# Patient Record
Sex: MALE | Employment: OTHER | URBAN - METROPOLITAN AREA
[De-identification: names, ages, dates, MRNs, and addresses within clinical notes are randomized per-mention and may not be internally consistent; named-entity substitution may affect disease eponyms.]

---

## 2017-01-24 ENCOUNTER — OFFICE VISIT (OUTPATIENT)
Dept: FAMILY MEDICINE | Facility: CLINIC | Age: 42
End: 2017-01-24
Payer: COMMERCIAL

## 2017-01-24 VITALS
OXYGEN SATURATION: 95 % | TEMPERATURE: 98 F | BODY MASS INDEX: 26.9 KG/M2 | SYSTOLIC BLOOD PRESSURE: 113 MMHG | HEART RATE: 69 BPM | WEIGHT: 203 LBS | DIASTOLIC BLOOD PRESSURE: 69 MMHG | HEIGHT: 73 IN | RESPIRATION RATE: 20 BRPM

## 2017-01-24 DIAGNOSIS — B34.9 VIRAL ILLNESS: Primary | ICD-10-CM

## 2017-01-24 LAB
FLUAV+FLUBV AG SPEC QL: NORMAL
FLUAV+FLUBV AG SPEC QL: NORMAL
SPECIMEN SOURCE: NORMAL

## 2017-01-24 PROCEDURE — 87804 INFLUENZA ASSAY W/OPTIC: CPT | Performed by: NURSE PRACTITIONER

## 2017-01-24 PROCEDURE — 99213 OFFICE O/P EST LOW 20 MIN: CPT | Performed by: NURSE PRACTITIONER

## 2017-01-24 NOTE — NURSING NOTE
"Chief Complaint   Patient presents with     Sick       Initial /69 mmHg  Pulse 69  Temp(Src) 98  F (36.7  C) (Oral)  Resp 20  Ht 6' 1\" (1.854 m)  Wt 203 lb (92.08 kg)  BMI 26.79 kg/m2  SpO2 95% Estimated body mass index is 26.79 kg/(m^2) as calculated from the following:    Height as of this encounter: 6' 1\" (1.854 m).    Weight as of this encounter: 203 lb (92.08 kg).  BP completed using cuff size: ebenezer Juares MA       "

## 2017-01-24 NOTE — PROGRESS NOTES
SUBJECTIVE:                                                    Yogesh Alanis is a 41 year old male who presents to clinic today for the following health issues:      RESPIRATORY SYMPTOMS      Duration: two days. Sudden onset of symptoms.     Description  nasal congestion, cough, headache and fever    Severity: moderate    Accompanying signs and symptoms: frequently gets sinus infections    History (predisposing factors):  none    Precipitating or alleviating factors: None    Therapies tried and outcome:  Aleve, tylenol, and emergenC     -------------------------------------    Problem list and histories reviewed & adjusted, as indicated.  Additional history: as documented    Patient Active Problem List   Diagnosis     Environmental allergies     CARDIOVASCULAR SCREENING; LDL GOAL LESS THAN 160     Past Surgical History   Procedure Laterality Date     C appendectomy       Surgical history of -        bilateral knee arthroscopies     Hc repair achilles tendon,primary       left       Social History   Substance Use Topics     Smoking status: Former Smoker     Quit date: 04/28/1999     Smokeless tobacco: Never Used     Alcohol Use: Yes      Comment: social     Family History   Problem Relation Age of Onset     Asthma Sister      DIABETES No family hx of      Hypertension No family hx of      CEREBROVASCULAR DISEASE No family hx of      C.A.D. Paternal Grandmother      Breast Cancer Mother      Cancer - colorectal No family hx of      Prostate Cancer No family hx of      Alcohol/Drug No family hx of          Current Outpatient Prescriptions   Medication Sig Dispense Refill     cromolyn (OPTICROM) 4 % ophthalmic solution Place 1 drop into both eyes 4 times daily 1 Bottle 5     LANsoprazole (PREVACID) 30 MG capsule Take 1 capsule (30 mg) by mouth daily 30 capsule 11     vitamin  B complex with vitamin C (VITAMIN  B COMPLEX) TABS Take 1 tablet by mouth daily       Ascorbic Acid (VITAMIN C PO) Take 250 mg by mouth daily   "     fluticasone (FLONASE) 50 MCG/ACT nasal spray Spray 1-2 sprays into both nostrils daily 1 Package 11     UNKNOWN TO PATIENT        GARLIC PO 3 capsules a day       Multiple Vitamin (MULTIVITAMIN OR) Take  by mouth.       indomethacin (INDOCIN) 25 MG capsule Take 1 capsule (25 mg) by mouth 3 times daily (with meals) for 14 days 42 capsule 0     Allergies   Allergen Reactions     Amoxicillin      Hypertension and high blood pressure     Hydrocodone-Acetaminophen      Precipitates migraine headaches     Recent Labs   Lab Test  12/17/14   0912  11/11/13   1433  02/28/13   1610   LDL  76   --   79   HDL  52   --   44   TRIG  91   --   63   ALT  34  36   --    CR  0.99  0.93   --    GFRESTIMATED  84  >90   --    GFRESTBLACK  >90   GFR Calc    >90   --    POTASSIUM  4.3  4.2   --    TSH  2.01   --    --       BP Readings from Last 3 Encounters:   01/24/17 113/69   04/01/15 118/60   03/30/15 118/70    Wt Readings from Last 3 Encounters:   01/24/17 203 lb (92.08 kg)   04/01/15 195 lb (88.451 kg)   03/30/15 197 lb (89.359 kg)              Labs reviewed in EPIC  Problem list, Medication list, Allergies, and Medical/Social/Surgical histories reviewed in [facility] and updated as appropriate.    ROS:  Constitutional, HEENT, cardiovascular, pulmonary, gi and gu systems are negative, except as otherwise noted.    OBJECTIVE:                                                    /69 mmHg  Pulse 69  Temp(Src) 98  F (36.7  C) (Oral)  Resp 20  Ht 6' 1\" (1.854 m)  Wt 203 lb (92.08 kg)  BMI 26.79 kg/m2  SpO2 95%  Body mass index is 26.79 kg/(m^2).  EXAM:  Constitutional: healthy, alert, active and moderate distress  Neck: Neck supple. No adenopathy.  ENT: Bilateral TM's are normal.  Posterior oropharynx is clear.  Nares clear without congestion.  Cardiovascular: negative, PMI normal. No lifts, heaves, or thrills. RRR. No murmurs, clicks gallops or rub, No edema or JVD.  Respiratory: occassional hacky " coughRespirations easy and regular. No respiratory distress. Lungs sounds CTA.  Skin: warm and dry  Psychiatric: mentation appears normal. and affect normal/bright      Results for orders placed or performed in visit on 01/24/17   Influenza A/B antigen   Result Value Ref Range    Influenza A/B Agn Specimen Nasopharyngeal     Influenza A  NEG     Negative   Test results must be correlated with clinical data. If necessary, results   should be confirmed by a molecular assay or viral culture.      Influenza B  NEG     Negative   Test results must be correlated with clinical data. If necessary, results   should be confirmed by a molecular assay or viral culture.            ASSESSMENT/PLAN:                                                    (B34.9) Viral illness  (primary encounter diagnosis)  Comment: influenza negative  Plan: Influenza A/B antigen        Supportive management. Push fluids, rest. OTC cough and cold remedies, homeopathics, vitamin D okay. Anticipate that within 5-7 days sx should be improving. Call or return with new or worsening sx.      MASHA Zambrano [facility]

## 2017-09-27 ENCOUNTER — OFFICE VISIT (OUTPATIENT)
Dept: FAMILY MEDICINE | Facility: CLINIC | Age: 42
End: 2017-09-27
Payer: COMMERCIAL

## 2017-09-27 VITALS
SYSTOLIC BLOOD PRESSURE: 123 MMHG | WEIGHT: 195 LBS | OXYGEN SATURATION: 98 % | HEART RATE: 60 BPM | BODY MASS INDEX: 25.73 KG/M2 | DIASTOLIC BLOOD PRESSURE: 77 MMHG | RESPIRATION RATE: 20 BRPM

## 2017-09-27 DIAGNOSIS — K21.9 GASTROESOPHAGEAL REFLUX DISEASE WITHOUT ESOPHAGITIS: ICD-10-CM

## 2017-09-27 DIAGNOSIS — R07.81 RIB PAIN ON LEFT SIDE: Primary | ICD-10-CM

## 2017-09-27 PROCEDURE — 99213 OFFICE O/P EST LOW 20 MIN: CPT | Performed by: NURSE PRACTITIONER

## 2017-09-27 RX ORDER — LANSOPRAZOLE 30 MG/1
30 CAPSULE, DELAYED RELEASE ORAL DAILY
Qty: 30 CAPSULE | Refills: 0 | Status: SHIPPED | OUTPATIENT
Start: 2017-09-27 | End: 2018-12-03

## 2017-09-27 NOTE — PROGRESS NOTES
"  SUBJECTIVE:   Yogesh Alanis is a 42 year old male who presents to clinic today for the following health issues:      Musculoskeletal problem/pain      Duration: one month     Description  Dull, heavy pain under his left lower rib   The last day or two he has felt \"off,\" nauseated, cramping.  Appetite is good, but \"when I eat I feel extremely full.\"  No fever or weight loss.        Intensity:  mild    Accompanying signs and symptoms: dull ache - heavy feeling     History  Previous similar problem: no   Previous evaluation:  none    Precipitating or alleviating factors:  Trauma or overuse: no   Aggravating factors include: none    Therapies tried and outcome: nothing          Problem list and histories reviewed & adjusted, as indicated.  Additional history: as documented    Patient Active Problem List   Diagnosis     Environmental allergies     CARDIOVASCULAR SCREENING; LDL GOAL LESS THAN 160     Past Surgical History:   Procedure Laterality Date     C APPENDECTOMY       HC REPAIR ACHILLES TENDON,PRIMARY      left     SURGICAL HISTORY OF -       bilateral knee arthroscopies       Social History   Substance Use Topics     Smoking status: Former Smoker     Quit date: 4/28/1999     Smokeless tobacco: Never Used     Alcohol use Yes      Comment: social     Family History   Problem Relation Age of Onset     Asthma Sister      DIABETES No family hx of      Hypertension No family hx of      CEREBROVASCULAR DISEASE No family hx of      C.A.D. Paternal Grandmother      Breast Cancer Mother      Cancer - colorectal No family hx of      Prostate Cancer No family hx of      Alcohol/Drug No family hx of          Current Outpatient Prescriptions   Medication Sig Dispense Refill     cromolyn (OPTICROM) 4 % ophthalmic solution Place 1 drop into both eyes 4 times daily 1 Bottle 5     LANsoprazole (PREVACID) 30 MG capsule Take 1 capsule (30 mg) by mouth daily 30 capsule 11     vitamin  B complex with vitamin C (VITAMIN  B COMPLEX) " TABS Take 1 tablet by mouth daily       Ascorbic Acid (VITAMIN C PO) Take 250 mg by mouth daily       fluticasone (FLONASE) 50 MCG/ACT nasal spray Spray 1-2 sprays into both nostrils daily 1 Package 11     UNKNOWN TO PATIENT        GARLIC PO 3 capsules a day       Multiple Vitamin (MULTIVITAMIN OR) Take  by mouth.       indomethacin (INDOCIN) 25 MG capsule Take 1 capsule (25 mg) by mouth 3 times daily (with meals) for 14 days 42 capsule 0     Allergies   Allergen Reactions     Amoxicillin      Hypertension and high blood pressure     Hydrocodone-Acetaminophen      Precipitates migraine headaches     Recent Labs   Lab Test  12/17/14   0912  11/11/13   1433  02/28/13   1610   LDL  76   --   79   HDL  52   --   44   TRIG  91   --   63   ALT  34  36   --    CR  0.99  0.93   --    GFRESTIMATED  84  >90   --    GFRESTBLACK  >90   GFR Calc    >90   --    POTASSIUM  4.3  4.2   --    TSH  2.01   --    --       BP Readings from Last 3 Encounters:   09/27/17 123/77   01/24/17 113/69   04/01/15 118/60    Wt Readings from Last 3 Encounters:   09/27/17 195 lb (88.5 kg)   01/24/17 203 lb (92.1 kg)   04/01/15 195 lb (88.5 kg)             Labs reviewed in EPIC    Reviewed and updated as needed this visit by clinical staff     Reviewed and updated as needed this visit by Provider         ROS:  Constitutional, HEENT, cardiovascular, pulmonary, GI, , musculoskeletal, neuro, skin, endocrine and psych systems are negative, except as otherwise noted.      OBJECTIVE:   /77  Pulse 60  Resp 20  Wt 195 lb (88.5 kg)  SpO2 98%  BMI 25.73 kg/m2  Body mass index is 25.73 kg/(m^2).  Constitutional: healthy, alert and woried.  Neck: supple, no adenopathy  Cardiovascular: RRR. No murmurs, clicks gallops or rub  Respiratory: Respirations easy and regular. No respiratory distress noted. Lung sounds clear to auscultation.He complains that the site of his point tenderness is his lower ribs, not abdominal.  Gastrointestinal:  abdomen flat, soft, nontender to light or deep palpation. Bowel sounds active in 4 quadrants. No hepatosplenomegaly. No rebound or guarding.   Neurologic: Gait normal. Reflexes normal and symmetric. Sensation grossly WNL.  Psychiatric: mentation appears normal and affect normal/bright        ASSESSMENT/PLAN:     (R07.81) Rib pain on left side  (primary encounter diagnosis)  Comment: Acute  Plan: I discussed with Bharath that I feel that his pain is ribs did not abdomen.  This may be musculoskeletal, related to his aggressive exercise routines.  I did encourage him to use ibuprofen regularly to help reduce inflammation and pain.  I discussed red flag symptoms to be reevaluated such as worsening pain, difficulty breathing, or any other new symptom.    (K21.9) Gastroesophageal reflux disease without esophagitis  Comment: History of/worse  Plan: LANsoprazole (PREVACID) 30 MG CR capsule        He does have a history of gastroesophageal reflux disease. Things in his life of been much better with his indigestion but he does report intermittent indigestion. I did go ahead and give him a refill of the Prevacid today. I also discussed with him the appropriate use of intermittent Zantac for gastroesophageal reflux disease and to use the Prevacid with severe breakthrough symptoms. He agreed and understands.          MASHA Zambrano [facility]

## 2017-09-27 NOTE — MR AVS SNAPSHOT
"              After Visit Summary   2017    Yogesh Alanis    MRN: [id number]           Patient Information     Date Of Birth          1975        Visit Information        Provider Department      2017 9:20 AM Ester Payan APRN CNP [facility]        Today's Diagnoses     Rib pain on left side    -  1    Gastroesophageal reflux disease without esophagitis           Follow-ups after your visit        Who to contact     If you have questions or need follow up information about today's clinic visit or your schedule please contact [facility] directly at [phone].  Normal or non-critical lab and imaging results will be communicated to you by MyChart, letter or phone within 4 business days after the clinic has received the results. If you do not hear from us within 7 days, please contact the clinic through [employer] or phone. If you have a critical or abnormal lab result, we will notify you by phone as soon as possible.  Submit refill requests through [employer] or call your pharmacy and they will forward the refill request to us. Please allow 3 business days for your refill to be completed.          Additional Information About Your Visit        MyChart Information     [employer] lets you send messages to your doctor, view your test results, renew your prescriptions, schedule appointments and more. To sign up, go to [email or web address] . Click on \"Log in\" on the left side of the screen, which will take you to the Welcome page. Then click on \"Sign up Now\" on the right side of the page.     You will be asked to enter the access code listed below, as well as some personal information. Please follow the directions to create your username and password.     Your access code is: [email or web address]  Expires: 2017 12:43 PM     Your access code will  in 90 days. If you need help or a new code, please call your [facility] or [phone].        Care " EveryWhere ID     This is your Care EveryWhere ID. This could be used by other organizations to access your [facility] [place] [facility] medical records  [id number]        Your Vitals Were     Pulse Respirations Pulse Oximetry BMI (Body Mass Index)          60 20 98% 25.73 kg/m2         Blood Pressure from Last 3 Encounters:   09/27/17 123/77   01/24/17 113/69   04/01/15 118/60    Weight from Last 3 Encounters:   09/27/17 195 lb (88.5 kg)   01/24/17 203 lb (92.1 kg)   04/01/15 195 lb (88.5 kg)              Today, you had the following     No orders found for display         Where to get your medicines      These medications were sent to [facility] - [place], [place] - [address]  [address], [place] [address]     Phone:  [phone]     LANsoprazole 30 MG CR capsule          Primary Care Provider Office Phone # Fax #    Ester Payan, MASHA [facility] [phone]       [address] [place] [address]        Equal Access to Services     [facility] SIMMONS : Hadii aad ku hadasho Soomaali, waaxda luqadaha, qaybta kaalmada adejooyamary, tariq de leon . So [facility] [phone].    ATENCIÓN: Si habla español, tiene a finn disposición servicios gratuitos de asistencia lingüística. [facility] [phone].    We comply with applicable federal civil rights laws and [place] laws. We do not discriminate on the basis of race, color, national origin, age, disability, sex, sexual orientation, or gender identity.            Thank you!     Thank you for choosing [facility]  for your care. Our goal is always to provide you with excellent care. Hearing back from our patients is one way we can continue to improve our services. Please take a few minutes to complete the written survey that you may receive in the mail after your visit with us. Thank you!             Your Updated Medication List - Protect others around you: Learn how to safely use, store and throw  away your medicines at www.disposemymeds.org.          This list is accurate as of: 9/27/17 11:59 PM.  Always use your most recent med list.                   Brand Name Dispense Instructions for use Diagnosis    cromolyn 4 % ophthalmic solution    OPTICROM    1 Bottle    Place 1 drop into both eyes 4 times daily    Allergic conjunctivitis, bilateral       fluticasone 50 MCG/ACT spray    FLONASE    1 Package    Spray 1-2 sprays into both nostrils daily    Environmental allergies       GARLIC PO      3 capsules a day        indomethacin 25 MG capsule    INDOCIN    42 capsule    Take 1 capsule (25 mg) by mouth 3 times daily (with meals) for 14 days    Primary stabbing headache       LANsoprazole 30 MG CR capsule    PREVACID    30 capsule    Take 1 capsule (30 mg) by mouth daily    Gastroesophageal reflux disease without esophagitis       MULTIVITAMIN PO      Take  by mouth.        UNKNOWN TO PATIENT           vitamin B complex with vitamin C Tabs tablet      Take 1 tablet by mouth daily        VITAMIN C PO      Take 250 mg by mouth daily

## 2017-10-06 ENCOUNTER — TRANSFERRED RECORDS (OUTPATIENT)
Dept: HEALTH INFORMATION MANAGEMENT | Facility: CLINIC | Age: 42
End: 2017-10-06

## 2017-10-13 ENCOUNTER — TRANSFERRED RECORDS (OUTPATIENT)
Dept: HEALTH INFORMATION MANAGEMENT | Facility: CLINIC | Age: 42
End: 2017-10-13

## 2017-10-27 ENCOUNTER — TRANSFERRED RECORDS (OUTPATIENT)
Dept: HEALTH INFORMATION MANAGEMENT | Facility: CLINIC | Age: 42
End: 2017-10-27

## 2017-11-01 ENCOUNTER — OFFICE VISIT (OUTPATIENT)
Dept: FAMILY MEDICINE | Facility: CLINIC | Age: 42
End: 2017-11-01
Payer: COMMERCIAL

## 2017-11-01 VITALS
DIASTOLIC BLOOD PRESSURE: 78 MMHG | TEMPERATURE: 97.9 F | OXYGEN SATURATION: 98 % | SYSTOLIC BLOOD PRESSURE: 124 MMHG | HEART RATE: 64 BPM | WEIGHT: 205 LBS | RESPIRATION RATE: 16 BRPM | BODY MASS INDEX: 27.05 KG/M2

## 2017-11-01 DIAGNOSIS — R10.12 LUQ ABDOMINAL PAIN: Primary | ICD-10-CM

## 2017-11-01 PROCEDURE — 99213 OFFICE O/P EST LOW 20 MIN: CPT | Performed by: NURSE PRACTITIONER

## 2017-11-01 NOTE — PROGRESS NOTES
SUBJECTIVE:   Yogesh Alanis is a 42 year old male who presents to clinic today for the following health issues:  Chief Complaint   Patient presents with     Rib Pain          Follow up 09/27/2017 office visit for left lower rib pain/left upper abdominal pain.  Bharath has problems with a dull constant LUQ abdominal pain.  He was seen by GI. They did a  barium swallow and no abnormalities were noted except for reflux.  His pain has typically rated 2-3 out of 10, but sometimes is as significant as a 6 out of 10.  He has taken ibuprofen intermittently, but overall feels it has not been helpful.  He is feeling great otherwise with no fever or chills.    he denies nausea, vomiting or diarrhea.  He denies any other acute changes today.        Patient Active Problem List   Diagnosis     Environmental allergies     CARDIOVASCULAR SCREENING; LDL GOAL LESS THAN 160     Gastroesophageal reflux disease without esophagitis     Past Surgical History:   Procedure Laterality Date     C APPENDECTOMY       HC REPAIR ACHILLES TENDON,PRIMARY      left     SURGICAL HISTORY OF -       bilateral knee arthroscopies       Social History   Substance Use Topics     Smoking status: Former Smoker     Quit date: 4/28/1999     Smokeless tobacco: Never Used     Alcohol use Yes      Comment: social     Family History   Problem Relation Age of Onset     Breast Cancer Mother      C.A.D. Paternal Grandmother      Asthma Sister      DIABETES No family hx of      Hypertension No family hx of      CEREBROVASCULAR DISEASE No family hx of      Cancer - colorectal No family hx of      Prostate Cancer No family hx of      Alcohol/Drug No family hx of          Current Outpatient Prescriptions   Medication Sig Dispense Refill     LANsoprazole (PREVACID) 30 MG CR capsule Take 1 capsule (30 mg) by mouth daily 30 capsule 0     cromolyn (OPTICROM) 4 % ophthalmic solution Place 1 drop into both eyes 4 times daily 1 Bottle 5     vitamin  B complex with vitamin C  (VITAMIN  B COMPLEX) TABS Take 1 tablet by mouth daily       Ascorbic Acid (VITAMIN C PO) Take 250 mg by mouth daily       fluticasone (FLONASE) 50 MCG/ACT nasal spray Spray 1-2 sprays into both nostrils daily 1 Package 11     UNKNOWN TO PATIENT        GARLIC PO 3 capsules a day       Multiple Vitamin (MULTIVITAMIN OR) Take  by mouth.       Allergies   Allergen Reactions     Amoxicillin      Hypertension and high blood pressure     Hydrocodone-Acetaminophen      Precipitates migraine headaches     Recent Labs   Lab Test  12/17/14   0912  11/11/13   1433  02/28/13   1610   LDL  76   --   79   HDL  52   --   44   TRIG  91   --   63   ALT  34  36   --    CR  0.99  0.93   --    GFRESTIMATED  84  >90   --    GFRESTBLACK  >90   GFR Calc    >90   --    POTASSIUM  4.3  4.2   --    TSH  2.01   --    --       BP Readings from Last 3 Encounters:   11/01/17 124/78   09/27/17 123/77   01/24/17 113/69    Wt Readings from Last 3 Encounters:   11/01/17 205 lb (93 kg)   09/27/17 195 lb (88.5 kg)   01/24/17 203 lb (92.1 kg)            Labs reviewed in EPIC    Reviewed and updated as needed this visit by clinical staff       Reviewed and updated as needed this visit by Provider         ROS:  Constitutional, HEENT, cardiovascular, pulmonary, GI, , musculoskeletal, neuro, skin, endocrine and psych systems are negative, except as otherwise noted.      OBJECTIVE:   /78  Pulse 64  Temp 97.9  F (36.6  C) (Oral)  Resp 16  Wt 205 lb (93 kg)  SpO2 98%  BMI 27.05 kg/m2  Body mass index is 27.05 kg/(m^2).  GENERAL: healthy, alert and no distress  NECK: no adenopathy, no asymmetry, masses, or scars and thyroid normal to palpation  RESP: lungs clear to auscultation - no rales, rhonchi or wheezes. He complains of point tenderness to his left midline lower rib/upper abdominal area. No palpable abnormalities in this area.  CV: regular rate and rhythm, normal S1 S2, no S3 or S4, no murmur, click or rub, no peripheral  edema and peripheral pulses strong  ABDOMEN: soft, nontender, no hepatosplenomegaly, no masses and bowel sounds normal. No rebound or guarding.  No hernias appreciated.  SKIN:  warm and dry  PSYCH: mentation appears normal, affect normal/bright    Diagnostic Test Results:  Ultrasound ordered/pending    ASSESSMENT/PLAN:     (R10.12) LUQ abdominal pain  (primary encounter diagnosis)  Comment:    Uncertain  Plan: US Extremity Non Vascular Left        I discussed and reviewed which asked recurrent site for his pain.  There is no obvious hernia.  His gastroesophageal reflux disease and indigestion and is being evaluated and managed by gastrointestinal.  This is very likely musculoskeletal, but it is hard to tell.  For additional reassurance today, I did go ahead and order an ultrasound to be done of the area of pain.  He is okay to take ibuprofen or Tylenol intermittently for pain.  If the ultrasound is inconclusive, I would consider a computed tomography scan.  If his pain does not resolve, I would consider a computed tomography scan.  He agrees and understands and will let me know how his pain improves or worsens.    MASHA Zambrano [facility]

## 2017-11-01 NOTE — NURSING NOTE
"Chief Complaint   Patient presents with     Rib Pain       Initial /78  Pulse 64  Temp 97.9  F (36.6  C) (Oral)  Resp 16  Wt 205 lb (93 kg)  SpO2 98%  BMI 27.05 kg/m2 Estimated body mass index is 27.05 kg/(m^2) as calculated from the following:    Height as of 1/24/17: 6' 1\" (1.854 m).    Weight as of this encounter: 205 lb (93 kg).  Medication Reconciliation: complete       Carlos Juares MA       "

## 2017-11-01 NOTE — PATIENT INSTRUCTIONS
Please call [phone] to schedule the ultrasound.  Follow up with me with any worsening or unresolving symptoms.

## 2017-11-01 NOTE — MR AVS SNAPSHOT
"              After Visit Summary   11/1/2017    Yogesh Alanis    MRN: [id number]           Patient Information     Date Of Birth          1975        Visit Information        Provider Department      11/1/2017 6:20 PM Ester Payan APRN CNP [facility]        Today's Diagnoses     LUQ abdominal pain    -  1      Care Instructions    Please call [phone] to schedule the ultrasound.            Follow-ups after your visit        Future tests that were ordered for you today     Open Future Orders        Priority Expected Expires Ordered    US Extremity Non Vascular Left Routine  11/1/2018 11/1/2017            Who to contact     If you have questions or need follow up information about today's clinic visit or your schedule please contact [facility] directly at [phone].  Normal or non-critical lab and imaging results will be communicated to you by MyChart, letter or phone within 4 business days after the clinic has received the results. If you do not hear from us within 7 days, please contact the clinic through MyChart or phone. If you have a critical or abnormal lab result, we will notify you by phone as soon as possible.  Submit refill requests through [employer] or call your pharmacy and they will forward the refill request to us. Please allow 3 business days for your refill to be completed.          Additional Information About Your Visit        MyChart Information     Solaborate lets you send messages to your doctor, view your test results, renew your prescriptions, schedule appointments and more. To sign up, go to [email or web address] . Click on \"Log in\" on the left side of the screen, which will take you to the Welcome page. Then click on \"Sign up Now\" on the right side of the page.     You will be asked to enter the access code listed below, as well as some personal information. Please follow the directions to create your username and password.   "   Your access code is: [email or web address]  Expires: 2017 12:43 PM     Your access code will  in 90 days. If you need help or a new code, please call your [place] clinic or [phone].        Care EveryWhere ID     This is your Care EveryWhere ID. This could be used by other organizations to access your [place] medical records  [id number]        Your Vitals Were     Pulse Temperature Respirations Pulse Oximetry BMI (Body Mass Index)       64 97.9  F (36.6  C) (Oral) 16 98% 27.05 kg/m2        Blood Pressure from Last 3 Encounters:   17 124/78   17 123/77   17 113/69    Weight from Last 3 Encounters:   17 205 lb (93 kg)   17 195 lb (88.5 kg)   17 203 lb (92.1 kg)                 Today's Medication Changes          These changes are accurate as of: 17  6:25 PM.  If you have any questions, ask your nurse or doctor.               Stop taking these medicines if you haven't already. Please contact your care team if you have questions.     indomethacin 25 MG capsule   Commonly known as:  INDOCIN   Stopped by:  Ester Payan APRN CNP                    Primary Care Provider Office Phone # Fax #    MASHA Hobbs [phone] [address] [place] [address]        Equal Access to Services     KALI SIMMONS AH: Hadcindy Lopez, waaxda luqadaha, qaybta kaalmada tariq roozco adejoo montes. So [facility] [phone].    ATENCIÓN: Si habla español, tiene a finn disposición servicios gratuitos de asistencia lingüística. Llame al [phone].    We comply with applicable federal civil rights laws and [place] laws. We do not discriminate on the basis of race, color, national origin, age, disability, sex, sexual orientation, or gender identity.            Thank you!     Thank you for choosing [facility]  for your care. Our goal is always to provide you with excellent care.  Hearing back from our patients is one way we can continue to improve our services. Please take a few minutes to complete the written survey that you may receive in the mail after your visit with us. Thank you!             Your Updated Medication List - Protect others around you: Learn how to safely use, store and throw away your medicines at www.disposemymeds.org.          This list is accurate as of: 11/1/17  6:25 PM.  Always use your most recent med list.                   Brand Name Dispense Instructions for use Diagnosis    cromolyn 4 % ophthalmic solution    OPTICROM    1 Bottle    Place 1 drop into both eyes 4 times daily    Allergic conjunctivitis, bilateral       fluticasone 50 MCG/ACT spray    FLONASE    1 Package    Spray 1-2 sprays into both nostrils daily    Environmental allergies       GARLIC PO      3 capsules a day        LANsoprazole 30 MG CR capsule    PREVACID    30 capsule    Take 1 capsule (30 mg) by mouth daily    Gastroesophageal reflux disease without esophagitis       MULTIVITAMIN PO      Take  by mouth.        UNKNOWN TO PATIENT           vitamin B complex with vitamin C Tabs tablet      Take 1 tablet by mouth daily        VITAMIN C PO      Take 250 mg by mouth daily

## 2017-11-06 DIAGNOSIS — R10.12 LUQ ABDOMINAL PAIN: ICD-10-CM

## 2017-11-06 LAB
BASOPHILS # BLD AUTO: 0 10E9/L (ref 0–0.2)
BASOPHILS NFR BLD AUTO: 0.1 %
DIFFERENTIAL METHOD BLD: NORMAL
EOSINOPHIL # BLD AUTO: 0.3 10E9/L (ref 0–0.7)
EOSINOPHIL NFR BLD AUTO: 3.9 %
ERYTHROCYTE [DISTWIDTH] IN BLOOD BY AUTOMATED COUNT: 12.6 % (ref 10–15)
HCT VFR BLD AUTO: 45.3 % (ref 40–53)
HGB BLD-MCNC: 15.2 G/DL (ref 13.3–17.7)
LYMPHOCYTES # BLD AUTO: 2 10E9/L (ref 0.8–5.3)
LYMPHOCYTES NFR BLD AUTO: 28 %
MCH RBC QN AUTO: 30 PG (ref 26.5–33)
MCHC RBC AUTO-ENTMCNC: 33.6 G/DL (ref 31.5–36.5)
MCV RBC AUTO: 89 FL (ref 78–100)
MONOCYTES # BLD AUTO: 0.7 10E9/L (ref 0–1.3)
MONOCYTES NFR BLD AUTO: 9.9 %
NEUTROPHILS # BLD AUTO: 4.2 10E9/L (ref 1.6–8.3)
NEUTROPHILS NFR BLD AUTO: 58.1 %
PLATELET # BLD AUTO: 276 10E9/L (ref 150–450)
RBC # BLD AUTO: 5.07 10E12/L (ref 4.4–5.9)
WBC # BLD AUTO: 7.2 10E9/L (ref 4–11)

## 2017-11-06 PROCEDURE — 36415 COLL VENOUS BLD VENIPUNCTURE: CPT | Performed by: NURSE PRACTITIONER

## 2017-11-06 PROCEDURE — 80053 COMPREHEN METABOLIC PANEL: CPT | Performed by: NURSE PRACTITIONER

## 2017-11-06 PROCEDURE — 85025 COMPLETE CBC W/AUTO DIFF WBC: CPT | Performed by: NURSE PRACTITIONER

## 2017-11-07 LAB
ALBUMIN SERPL-MCNC: 4.5 G/DL (ref 3.4–5)
ALP SERPL-CCNC: 53 U/L (ref 40–150)
ALT SERPL W P-5'-P-CCNC: 41 U/L (ref 0–70)
ANION GAP SERPL CALCULATED.3IONS-SCNC: 5 MMOL/L (ref 3–14)
AST SERPL W P-5'-P-CCNC: 19 U/L (ref 0–45)
BILIRUB SERPL-MCNC: 1.7 MG/DL (ref 0.2–1.3)
BUN SERPL-MCNC: 18 MG/DL (ref 7–30)
CALCIUM SERPL-MCNC: 9.1 MG/DL (ref 8.5–10.1)
CHLORIDE SERPL-SCNC: 104 MMOL/L (ref 94–109)
CO2 SERPL-SCNC: 27 MMOL/L (ref 20–32)
CREAT SERPL-MCNC: 1.01 MG/DL (ref 0.66–1.25)
GFR SERPL CREATININE-BSD FRML MDRD: 81 ML/MIN/1.7M2
GLUCOSE SERPL-MCNC: 99 MG/DL (ref 70–99)
POTASSIUM SERPL-SCNC: 4 MMOL/L (ref 3.4–5.3)
PROT SERPL-MCNC: 7.9 G/DL (ref 6.8–8.8)
SODIUM SERPL-SCNC: 136 MMOL/L (ref 133–144)

## 2017-11-08 NOTE — PROGRESS NOTES
Marlo Sinha,    This note is to let you know that your complete blood count and metabolic panel looked good.    One of your liver function studies, the bilirubin, came back slightly high. The rest of your liver panel is normal so this is not overly concerning at this time.    Let's see how you feel. For follow-up, I would recommend that you recheck your bilirubin level in 2-3 months.    Ester

## 2018-11-12 ENCOUNTER — OFFICE VISIT (OUTPATIENT)
Dept: FAMILY MEDICINE | Facility: CLINIC | Age: 43
End: 2018-11-12
Payer: COMMERCIAL

## 2018-11-12 VITALS
WEIGHT: 186 LBS | RESPIRATION RATE: 18 BRPM | SYSTOLIC BLOOD PRESSURE: 124 MMHG | BODY MASS INDEX: 24.54 KG/M2 | DIASTOLIC BLOOD PRESSURE: 78 MMHG | TEMPERATURE: 98.6 F | HEART RATE: 76 BPM | OXYGEN SATURATION: 98 %

## 2018-11-12 DIAGNOSIS — F11.93 OPIOID WITHDRAWAL (H): Primary | ICD-10-CM

## 2018-11-12 PROCEDURE — 99213 OFFICE O/P EST LOW 20 MIN: CPT | Performed by: FAMILY MEDICINE

## 2018-11-12 RX ORDER — CLONIDINE HYDROCHLORIDE 0.1 MG/1
0.1 TABLET ORAL 3 TIMES DAILY
Qty: 15 TABLET | Refills: 0 | Status: SHIPPED | OUTPATIENT
Start: 2018-11-12 | End: 2018-11-15

## 2018-11-12 RX ORDER — HYDROXYZINE HYDROCHLORIDE 25 MG/1
TABLET, FILM COATED ORAL
Qty: 30 TABLET | Refills: 1 | Status: SHIPPED | OUTPATIENT
Start: 2018-11-12 | End: 2018-12-03

## 2018-11-12 NOTE — MR AVS SNAPSHOT
After Visit Summary   11/12/2018    Yogesh Alanis    MRN: [id number]           Patient Information     Date Of Birth          1975        Visit Information        Provider Department      11/12/2018 9:40 AM Tatiana Samano MD [facility]        Today's Diagnoses     Opioid withdrawal (H)    -  1       Follow-ups after your visit        Follow-up notes from your care team     Return in about 2 days (around 11/14/2018), or if symptoms worsen or fail to improve.      Who to contact     If you have questions or need follow up information about today's clinic visit or your schedule please contact [facility] directly at [phone].  Normal or non-critical lab and imaging results will be communicated to you by [email or web address], letter or phone within 4 business days after the clinic has received the results. If you do not hear from us within 7 days, please contact the clinic through [email or web address] or phone. If you have a critical or abnormal lab result, we will notify you by phone as soon as possible.  Submit refill requests through [employer] or call your pharmacy and they will forward the refill request to us. Please allow 3 business days for your refill to be completed.          Additional Information About Your Visit        MyChart Information     [employer] gives you secure access to your electronic health record. If you see a primary care provider, you can also send messages to your care team and make appointments. If you have questions, please call your primary care clinic.  If you do not have a primary care provider, please call [phone] and they will assist you.        Care EveryWhere ID     This is your Care EveryWhere ID. This could be used by other organizations to access your [place] medical records  [id number]        Your Vitals Were     Pulse Temperature Respirations Pulse Oximetry BMI (Body Mass Index)       76 98.6  F (37  C) (Oral) 18 98%  24.54 kg/m2        Blood Pressure from Last 3 Encounters:   11/12/18 124/78   11/01/17 124/78   09/27/17 123/77    Weight from Last 3 Encounters:   11/12/18 186 lb (84.4 kg)   11/01/17 205 lb (93 kg)   09/27/17 195 lb (88.5 kg)              Today, you had the following     No orders found for display         Today's Medication Changes          These changes are accurate as of 11/12/18  8:33 PM.  If you have any questions, ask your nurse or doctor.               Start taking these medicines.        Dose/Directions    cloNIDine 0.1 MG tablet   Commonly known as:  CATAPRES   Used for:  Opioid withdrawal (H)   Started by:  Tatiana Samano MD        Dose:  0.1 mg   Take 1 tablet (0.1 mg) by mouth 3 times daily X 3 days then bid x 2 days then once daily x 2 days then discontinue prn withdrawal   Quantity:  15 tablet   Refills:  0       hydrOXYzine 25 MG tablet   Commonly known as:  ATARAX   Used for:  Opioid withdrawal (H)   Started by:  Tatiana Samano MD        1-2 tabs q 6 hours anxiety   Quantity:  30 tablet   Refills:  1            Where to get your medicines      These medications were sent to [facility] - [place], [place] - [address]  [address], [place] [address]     Phone:  [phone]     cloNIDine 0.1 MG tablet    hydrOXYzine 25 MG tablet                Primary Care Provider Office Phone # Fax #    Ester Payan, APRN [facility] [phone]       [address] [place] [address]        Equal Access to Services     VA SIMMONS AH: Joo Lopez, wadwightda luqromeo, qaybta kaalmatariq do. So [facility] [phone].    ATENCIÓN: Si habla español, tiene a finn disposición servicios gratuitos de asistencia lingüística. John al [phone].    We comply with applicable federal civil rights laws and [place] laws. We do not discriminate on the basis of race, color,  national origin, age, disability, sex, sexual orientation, or gender identity.            Thank you!     Thank you for choosing [facility]  for your care. Our goal is always to provide you with excellent care. Hearing back from our patients is one way we can continue to improve our services. Please take a few minutes to complete the written survey that you may receive in the mail after your visit with us. Thank you!             Your Updated Medication List - Protect others around you: Learn how to safely use, store and throw away your medicines at www.disposemymeds.org.          This list is accurate as of 11/12/18  8:33 PM.  Always use your most recent med list.                   Brand Name Dispense Instructions for use Diagnosis    cloNIDine 0.1 MG tablet    CATAPRES    15 tablet    Take 1 tablet (0.1 mg) by mouth 3 times daily X 3 days then bid x 2 days then once daily x 2 days then discontinue prn withdrawal    Opioid withdrawal (H)       cromolyn 4 % ophthalmic solution    OPTICROM    1 Bottle    Place 1 drop into both eyes 4 times daily    Allergic conjunctivitis, bilateral       fluticasone 50 MCG/ACT spray    FLONASE    1 Package    Spray 1-2 sprays into both nostrils daily    Environmental allergies       GARLIC PO      3 capsules a day        hydrOXYzine 25 MG tablet    ATARAX    30 tablet    1-2 tabs q 6 hours anxiety    Opioid withdrawal (H)       LANsoprazole 30 MG CR capsule    PREVACID    30 capsule    Take 1 capsule (30 mg) by mouth daily    Gastroesophageal reflux disease without esophagitis       MULTIVITAMIN PO      Take  by mouth.        UNKNOWN TO PATIENT           vitamin B complex with vitamin C Tabs tablet      Take 1 tablet by mouth daily        VITAMIN C PO      Take 250 mg by mouth daily

## 2018-11-15 ENCOUNTER — TELEPHONE (OUTPATIENT)
Dept: FAMILY MEDICINE | Facility: CLINIC | Age: 43
End: 2018-11-15

## 2018-11-15 DIAGNOSIS — F11.93 OPIOID WITHDRAWAL (H): ICD-10-CM

## 2018-11-15 RX ORDER — CLONIDINE HYDROCHLORIDE 0.1 MG/1
TABLET ORAL
Qty: 2 TABLET | Refills: 0 | Status: SHIPPED | OUTPATIENT
Start: 2018-11-15 | End: 2018-12-03

## 2018-11-15 RX ORDER — CLONIDINE HYDROCHLORIDE 0.1 MG/1
0.1 TABLET ORAL 3 TIMES DAILY
Qty: 2 TABLET | Refills: 0 | Status: CANCELLED | OUTPATIENT
Start: 2018-11-15

## 2018-11-15 NOTE — TELEPHONE ENCOUNTER
Sent in 2 additional clonidine to his pharmacy to finish taper.    He could try 3 hydroxyzine before he goes to bed if desired.  Taking another at 1-2 AM is fine if he has good results.    I think once the plate is out in December, much of this will improve.  For now can use 1-4 hyroxyzine q 6 hours prn anxiety/sleep.

## 2018-11-15 NOTE — TELEPHONE ENCOUNTER
Writer called patient left non detailed message requesting return call to clinic and ask to speak with nurse    Carlita Chan RN

## 2018-11-15 NOTE — TELEPHONE ENCOUNTER
Routing to provider - [place] - please review and advise as appropriate       1. Medication question:  cloNIDine (CATAPRES) 0.1 MG tablet    Patient states he is short 2 tablets of cloNIDine (CATAPRES) 0.1 MG tablet for completion of last taper day and wants to make sure he has these - having anxiety about having withdrawal - states pharmacy didn't dispense correctly - states compliant and taking appropriately    He was also concerned about withdrawal from the clonidine      2. Medication question: hydrOXYzine (ATARAX) 25 MG tablet    Sleep problem:  Keeps waking up at 2 am last couple of nights & this is causing him anxiety     He typically takes 2 tablets hydroxyzine before bed - wakes up around 2 am - takes 1 hydroxyzine and this is effective in helping him return to sleep - patient would like to know should he continue to take hydroxyzine when he wakes up at night? Should he being doing anything or does provider have any sleep suggestions?       3. Pre-op appointment request - surgically for shoulder plate removal on 12/11/18 - writer scheduled 12/3/18 10:00 AM      Thank you,  Carlita Chan RN

## 2018-12-03 ENCOUNTER — OFFICE VISIT (OUTPATIENT)
Dept: FAMILY MEDICINE | Facility: CLINIC | Age: 43
End: 2018-12-03
Payer: COMMERCIAL

## 2018-12-03 VITALS
SYSTOLIC BLOOD PRESSURE: 128 MMHG | TEMPERATURE: 97.8 F | RESPIRATION RATE: 16 BRPM | WEIGHT: 193 LBS | HEIGHT: 73 IN | HEART RATE: 69 BPM | DIASTOLIC BLOOD PRESSURE: 80 MMHG | BODY MASS INDEX: 25.58 KG/M2 | OXYGEN SATURATION: 97 %

## 2018-12-03 DIAGNOSIS — Z84.81 FAMILY HISTORY OF BRCA GENE POSITIVE: ICD-10-CM

## 2018-12-03 DIAGNOSIS — Z01.818 PREOP GENERAL PHYSICAL EXAM: Primary | ICD-10-CM

## 2018-12-03 DIAGNOSIS — Z96.9 RETAINED ORTHOPEDIC HARDWARE: ICD-10-CM

## 2018-12-03 LAB
BASOPHILS # BLD AUTO: 0 10E9/L (ref 0–0.2)
BASOPHILS NFR BLD AUTO: 0.3 %
DIFFERENTIAL METHOD BLD: NORMAL
EOSINOPHIL # BLD AUTO: 0.1 10E9/L (ref 0–0.7)
EOSINOPHIL NFR BLD AUTO: 2.2 %
ERYTHROCYTE [DISTWIDTH] IN BLOOD BY AUTOMATED COUNT: 12.7 % (ref 10–15)
HCT VFR BLD AUTO: 41.3 % (ref 40–53)
HGB BLD-MCNC: 13.4 G/DL (ref 13.3–17.7)
LYMPHOCYTES # BLD AUTO: 2.1 10E9/L (ref 0.8–5.3)
LYMPHOCYTES NFR BLD AUTO: 35 %
MCH RBC QN AUTO: 29.1 PG (ref 26.5–33)
MCHC RBC AUTO-ENTMCNC: 32.4 G/DL (ref 31.5–36.5)
MCV RBC AUTO: 90 FL (ref 78–100)
MONOCYTES # BLD AUTO: 0.5 10E9/L (ref 0–1.3)
MONOCYTES NFR BLD AUTO: 7.6 %
NEUTROPHILS # BLD AUTO: 3.2 10E9/L (ref 1.6–8.3)
NEUTROPHILS NFR BLD AUTO: 54.9 %
PLATELET # BLD AUTO: 298 10E9/L (ref 150–450)
RBC # BLD AUTO: 4.6 10E12/L (ref 4.4–5.9)
WBC # BLD AUTO: 5.9 10E9/L (ref 4–11)

## 2018-12-03 PROCEDURE — 36415 COLL VENOUS BLD VENIPUNCTURE: CPT | Performed by: FAMILY MEDICINE

## 2018-12-03 PROCEDURE — 99214 OFFICE O/P EST MOD 30 MIN: CPT | Performed by: FAMILY MEDICINE

## 2018-12-03 PROCEDURE — 85025 COMPLETE CBC W/AUTO DIFF WBC: CPT | Performed by: FAMILY MEDICINE

## 2018-12-03 ASSESSMENT — PAIN SCALES - GENERAL: PAINLEVEL: MILD PAIN (2)

## 2018-12-03 NOTE — MR AVS SNAPSHOT
After Visit Summary   12/3/2018    Yogesh Alanis    MRN: [id number]           Patient Information     Date Of Birth          1975        Visit Information        Provider Department      12/3/2018 10:00 AM Tatiana Samano MD [facility]        Today's Diagnoses     Preop general physical exam    -  1    Family history of BRCA gene positive          Care Instructions      Before Your Surgery      Call your surgeon if there is any change in your health. This includes signs of a cold or flu (such as a sore throat, runny nose, cough, rash or fever).    Do not smoke, drink alcohol or take over the counter medicine (unless your surgeon or primary care doctor tells you to) for the 24 hours before and after surgery.    If you take prescribed drugs: Follow your doctor s orders about which medicines to take and which to stop until after surgery.    Eating and drinking prior to surgery: follow the instructions from your surgeon    Take a shower or bath the night before surgery. Use the soap your surgeon gave you to gently clean your skin. If you do not have soap from your surgeon, use your regular soap. Do not shave or scrub the surgery site.  Wear clean pajamas and have clean sheets on your bed.           Follow-ups after your visit        Additional Services     CANCER RISK MGMT/CANCER GENETIC COUNSELING REFERRAL       Your provider has referred you to the Cancer Risk Management Program - Cancer Genetic Counseling.    Reason for Referral: BRCA positive mom and sister    We have a sent a notice to a staff member of the Cancer Risk Management Program to give you a call to assist with scheduling your appointment.  You may also call  [phone] to initiate scheduling.    Please be aware that coverage of these services is subject to the terms and limitations of your health insurance plan.  Call member services at your health plan with any  "benefit or coverage questions.      Please bring the completed family history sheet to your appointment in addition to any available outside medical records documenting your cancer diagnosis.                  Who to contact     If you have questions or need follow up information about today's clinic visit or your schedule please contact [facility] directly at [phone].  Normal or non-critical lab and imaging results will be communicated to you by MyChart, letter or phone within 4 business days after the clinic has received the results. If you do not hear from us within 7 days, please contact the clinic through SeekSherpahart or phone. If you have a critical or abnormal lab result, we will notify you by phone as soon as possible.  Submit refill requests through [employer] or call your pharmacy and they will forward the refill request to us. Please allow 3 business days for your refill to be completed.          Additional Information About Your Visit        MyChart Information     [employer] gives you secure access to your electronic health record. If you see a primary care provider, you can also send messages to your care team and make appointments. If you have questions, please call your primary care clinic.  If you do not have a primary care provider, please call [phone] and they will assist you.        Care EveryWhere ID     This is your Care EveryWhere ID. This could be used by other organizations to access your [place] medical records  [id number]        Your Vitals Were     Pulse Temperature Respirations Height Pulse Oximetry BMI (Body Mass Index)    69 97.8  F (36.6  C) (Oral) 16 6' 1\" (1.854 m) 97% 25.46 kg/m2       Blood Pressure from Last 3 Encounters:   12/03/18 128/80   11/12/18 124/78   11/01/17 124/78    Weight from Last 3 Encounters:   12/03/18 193 lb (87.5 kg)   11/12/18 186 lb (84.4 kg)   11/01/17 205 lb (93 kg)              We Performed the Following     CANCER RISK MGMT/CANCER " GENETIC COUNSELING REFERRAL     CBC with platelets and differential          Today's Medication Changes          These changes are accurate as of 12/3/18 10:46 AM.  If you have any questions, ask your nurse or doctor.               Stop taking these medicines if you haven't already. Please contact your care team if you have questions.     UNKNOWN TO PATIENT   Stopped by:  Tatiana Samano MD           vitamin B complex with vitamin C tablet   Stopped by:  Tatiana Samano MD                    Primary Care Provider Office Phone # Fax #    Ester Watsongaylanbalexandro, MASHA [facility] [phone] [address] [place] [address]        Equal Access to Services     KALI SIMMONS : Joo kento Sohaim, waaxda luqadaha, qaybta kaalmada adejooyada, tariq de leon . So [facility] [phone].    ATENCIÓN: Si habla español, tiene a finn disposición servicios gratuitos de asistencia lingüística. Llame al [phone].    We comply with applicable federal civil rights laws and [place] laws. We do not discriminate on the basis of race, color, national origin, age, disability, sex, sexual orientation, or gender identity.            Thank you!     Thank you for choosing [facility]  for your care. Our goal is always to provide you with excellent care. Hearing back from our patients is one way we can continue to improve our services. Please take a few minutes to complete the written survey that you may receive in the mail after your visit with us. Thank you!             Your Updated Medication List - Protect others around you: Learn how to safely use, store and throw away your medicines at www.disposemymeds.org.          This list is accurate as of 12/3/18 10:46 AM.  Always use your most recent med list.                   Brand Name Dispense Instructions for use Diagnosis    cloNIDine 0.1 MG tablet    CATAPRES    2 tablet     once daily x 2 days then discontinue prn withdrawal    Opioid withdrawal (H)       cromolyn 4 % ophthalmic solution    OPTICROM    1 Bottle    Place 1 drop into both eyes 4 times daily    Allergic conjunctivitis, bilateral       fluticasone 50 MCG/ACT nasal spray    FLONASE    1 Package    Spray 1-2 sprays into both nostrils daily    Environmental allergies       GARLIC PO      3 capsules a day        hydrOXYzine 25 MG tablet    ATARAX    30 tablet    1-2 tabs q 6 hours anxiety    Opioid withdrawal (H)       LANsoprazole 30 MG DR capsule    PREVACID    30 capsule    Take 1 capsule (30 mg) by mouth daily    Gastroesophageal reflux disease without esophagitis       MULTIVITAMIN PO      Take  by mouth.        VITAMIN C PO      Take 250 mg by mouth daily
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
## 2019-01-16 NOTE — TELEPHONE ENCOUNTER
RECORDS STATUS - BREAST    RECORDS RECEIVED FROM: [facility]   DATE RECEIVED: 1/16/2019   NOTES STATUS DETAILS   OFFICE NOTE from referring provider  Epic   OFFICE NOTE from medical oncologist     OFFICE NOTE from surgeon     OFFICE NOTE from radiation oncologist     DISCHARGE SUMMARY from hospital     DISCHARGE REPORT from the      OPERATIVE REPORT     MEDICATION LIST     CLINICAL TRIAL TREATMENTS TO DATE     LABS     PATHOLOGY REPORTS  (Tissue diagnosis, Stage, ER/NE percentage positive and intensity of staining, HER2 IHC, FISH, and all biopsies from breast and any distant metastasis)                  Epic   GENONOMIC TESTING     TYPE:   (Next Generation Sequencing, including Foundation One testing, and Oncotype score)     IMAGING (NEED IMAGES & REPORT)     CT SCANS     MRI     MAMMO     ULTRASOUND     PET     BONE SCAN     BRAIN MRI

## 2019-01-17 ENCOUNTER — PRE VISIT (OUTPATIENT)
Dept: ONCOLOGY | Facility: CLINIC | Age: 44
End: 2019-01-17

## 2019-01-17 ENCOUNTER — ONCOLOGY VISIT (OUTPATIENT)
Dept: ONCOLOGY | Facility: CLINIC | Age: 44
End: 2019-01-17
Attending: GENETIC COUNSELOR, MS
Payer: COMMERCIAL

## 2019-01-17 DIAGNOSIS — Z84.81 FAMILY HISTORY OF BRCA1 GENE POSITIVE: Primary | ICD-10-CM

## 2019-01-17 DIAGNOSIS — Z80.3 FAMILY HISTORY OF MALIGNANT NEOPLASM OF BREAST: ICD-10-CM

## 2019-01-17 LAB — MISCELLANEOUS TEST: NORMAL

## 2019-01-17 PROCEDURE — 96040 ZZH GENETIC COUNSELING, EACH 30 MINUTES: CPT | Performed by: GENETIC COUNSELOR, MS

## 2019-01-17 NOTE — PROGRESS NOTES
"1/17/2019    Referring Provider: Tatiana Samano MD    Presenting Information:   I met with Yogesh Alanis today for genetic counseling at the Cancer Risk Management Program at the [facility] to discuss the known BRCA1 mutation in his family. Specifically, the familial BRCA1 mutation identified in his sister is a pathogenic deletion of exons 8-11. He is here today to review this history, cancer screening recommendations, and available genetic testing options.    Personal History:  Yogesh is a 43 year old male.  He does not have any personal history of cancer.  He is followed by his primary care provider regularly, and reports no other cancer screening at this time.  Yogesh reported no current tobacco use, and no concerns regarding alcohol use or environmental exposures.  He has three children, ages 11, 14 and 21.    Family History: (Please see scanned pedigree for detailed family history information)    One sister is 40 and recently completed genetic testing due to the family history of breast cancer which identified a pathogenic deletion of exons 8-11 in the BRCA1 gene.  Per her test report, this specific mutation is called \"c.(?_548)_(4185+1_?)del and is a common large rearrangement Mexican  mutation. This testing, completed through the [facility] at [facility] included the \"Breast Cancer Expanded Panel\" (HEENA, BARD1, BRCA1, BRCA2, BRIP1, CDH1, MRE11A, MUTYH, NBN, PALB2, PTEN, RAD50, RAD51C, RAD51D, STK11, TP53).  A copy of her positive test report was available today for review, and Andrey's sister gave verbal consent to access this test result to facilitate testing today.        His mother was diagnosed with breast cancer in her early 50's and is now 61.  She has not completed any genetic testing.  She reportedly has two maternal half sisters who were diagnosed with early onset breast cancer (one in her 40's, and one in her 30's-40's).  No " "known genetic testing has been completed    His maternal grandmother passed away in her 30's-40's due to breast cancer.      Yogesh's paternal family history is not significant for any reported cancers.     His maternal ethnicity is Slovenian/Marshallese/Kazakh/. His paternal ethnicity is Welsh/Armenian [place].  There is no known Ashkenazi Roman Catholic ancestry on either side of his family.     Discussion:    Yogesh has a family history of breast cancer and an identified BRCA1 gene mutation.  Specifically, the mutation identified in his sister is called \"deletion of exons 8-11\".  We discussed that this gene mutation is consistent with a diagnosis of hereditary breast and ovarian cancer syndrome.  We discussed the impact of this testing on Yogesh and his family in detail.      We discussed the natural history and genetics of Hereditary Breast and Ovarian Cancer (HBOC) syndrome caused by mutations in the BRCA1 gene. A detailed handout regarding this cancer syndrome and the information we discussed was provided to Yogesh at the end of our appointment today and can be found in the after visit summary.  Topics included: inheritance pattern, cancer risks, cancer screening recommendations, and also risks, benefits and limitations of testing.    We reviewed that mutations in the BRCA1 gene are inherited in an autosomal dominant pattern.  This means that Yogesh has a 50% chance of inheriting the same mutation which was identified in his sister. Yogesh's maternal family of several relatives diagnosed with early onset breast cancer is suggestive of this BRCA1 gene mutation, however testing has not yet been completed in his family to confirm this inheritance.         Based on his family history of breast cancer, Yogesh meets the National Comprehensive Cancer Network (NCCN) criteria for genetic testing of BRCA1 and BRCA2.    We discussed that there are additional genes that could cause increased risk for breast cancer.  As " many of these genes present with overlapping features in a family and accurate cancer risk cannot always be established based upon the pedigree analysis alone, it would be reasonable for Yogesh to consider panel genetic testing to analyze multiple genes at once.  However, genetic testing for these additional breast cancer susceptibility genes is typically most informative when it is first performed on a family member with a personal history of breast cancer. In this situation, we discussed that Yogesh's mother would be the best person to pursue this testing.  Genetic counseling is recommended for his mother to discuss available testing options due to her significant personal and family history of breast cancer, including the BRCA1 gene mutation in their family.      We reviewed available genetic testing options for Yogesh, including single gene BRCA1 analysis and panel testing to include additional genes associated with breast cancer.  Yogesh expressed interest in only proceeding with testing for the BRCA1 gene at this time due to his sister's positive test result, and plans to follow up with his mother regarding the possibility of additional testing.      Consent was obtained and BRCA1 gene analysis was sent to [facility].  A copy of his sister's positive test report was also sent to the testing laboratory.  Results should be available in approximately 3-4 weeks.      We reviewed the implications of both positive and negative test result for Yogesh.  If testing comes back positive, Yogesh will be recommended to follow the screening recommendations for men with BRCA1 mutations as published by the National Comprehensive Cancer Network (NCCN). If testing comes back negative, Yogesh would not be at an increased risk for BRCA1-associated cancers.  We also discussed that Yogesh's results would also help determine the risk to his children carrying this mutation.  These recommendations will be  discussed in detail once genetic testing is completed.      Plan:  1) Today Yogesh elected to proceed with BRCA1 genetic testing through [facility].  2) This information should be available in 3-4 weeks.  3) Yogesh will be contacted to schedule a return genetic counseling appointment to discuss his results.      Face to face time: 40 minutes    Faye Mccarthy MS, [facility]  Licensed Genetic Counselor  [phone]

## 2019-01-17 NOTE — LETTER
"    1/17/2019         RE: Yogesh Alanis  [address] [place] [address]        Dear Colleague,    Thank you for referring your patient, Yogesh Alanis, to the CANCER RISK MANAGEMENT PROGRAM. Please see a copy of my visit note below.    1/17/2019    Referring Provider: Tatiana Samano MD    Presenting Information:   I met with Yogesh Alanis today for genetic counseling at the Cancer Risk Management Program at the [facility] to discuss the known BRCA1 mutation in his family. Specifically, the familial BRCA1 mutation identified in his sister is a pathogenic deletion of exons 8-11. He is here today to review this history, cancer screening recommendations, and available genetic testing options.    Personal History:  Yogesh is a 43 year old male.  He does not have any personal history of cancer.  He is followed by his primary care provider regularly, and reports no other cancer screening at this time.  Yogesh reported no current tobacco use, and no concerns regarding alcohol use or environmental exposures.  He has three children, ages 11, 14 and 21.    Family History: (Please see scanned pedigree for detailed family history information)    One sister is 40 and recently completed genetic testing due to the family history of breast cancer which identified a pathogenic deletion of exons 8-11 in the BRCA1 gene.  Per her test report, this specific mutation is called \"c.(?_548)_(0295+1_?)del and is a common large rearrangement Mexican  mutation. This testing, completed through the [facility] at [facility] included the \"Breast Cancer Expanded Panel\" (HEENA, BARD1, BRCA1, BRCA2, BRIP1, CDH1, MRE11A, MUTYH, NBN, PALB2, PTEN, RAD50, RAD51C, RAD51D, STK11, TP53).  A copy of her positive test report was available today for review, and Andrey's sister gave verbal consent to access this test result to facilitate testing today.        His mother was " "diagnosed with breast cancer in her early 50's and is now 61.  She has not completed any genetic testing.  She reportedly has two maternal half sisters who were diagnosed with early onset breast cancer (one in her 40's, and one in her 30's-40's).  No known genetic testing has been completed    His maternal grandmother passed away in her 30's-40's due to breast cancer.      Yogesh's paternal family history is not significant for any reported cancers.     His maternal ethnicity is Yoruba/Tanzanian/Arabic/. His paternal ethnicity is Dutch/Pakistani Latvian.  There is no known Ashkenazi Episcopalian ancestry on either side of his family.     Discussion:    Yogesh has a family history of breast cancer and an identified BRCA1 gene mutation.  Specifically, the mutation identified in his sister is called \"deletion of exons 8-11\".  We discussed that this gene mutation is consistent with a diagnosis of hereditary breast and ovarian cancer syndrome.  We discussed the impact of this testing on Yogesh and his family in detail.      We discussed the natural history and genetics of Hereditary Breast and Ovarian Cancer (HBOC) syndrome caused by mutations in the BRCA1 gene. A detailed handout regarding this cancer syndrome and the information we discussed was provided to Yogesh at the end of our appointment today and can be found in the after visit summary.  Topics included: inheritance pattern, cancer risks, cancer screening recommendations, and also risks, benefits and limitations of testing.    We reviewed that mutations in the BRCA1 gene are inherited in an autosomal dominant pattern.  This means that Yogesh has a 50% chance of inheriting the same mutation which was identified in his sister. Yogesh's maternal family of several relatives diagnosed with early onset breast cancer is suggestive of this BRCA1 gene mutation, however testing has not yet been completed in his family to confirm this inheritance.         Based on " his family history of breast cancer, Yogesh meets the National Comprehensive Cancer Network (NCCN) criteria for genetic testing of BRCA1 and BRCA2.    We discussed that there are additional genes that could cause increased risk for breast cancer.  As many of these genes present with overlapping features in a family and accurate cancer risk cannot always be established based upon the pedigree analysis alone, it would be reasonable for Yogesh to consider panel genetic testing to analyze multiple genes at once.  However, genetic testing for these additional breast cancer susceptibility genes is typically most informative when it is first performed on a family member with a personal history of breast cancer. In this situation, we discussed that Yogesh's mother would be the best person to pursue this testing.  Genetic counseling is recommended for his mother to discuss available testing options due to her significant personal and family history of breast cancer, including the BRCA1 gene mutation in their family.      We reviewed available genetic testing options for Yogesh, including single gene BRCA1 analysis and panel testing to include additional genes associated with breast cancer.  Yogesh expressed interest in only proceeding with testing for the BRCA1 gene at this time due to his sister's positive test result, and plans to follow up with his mother regarding the possibility of additional testing.      Consent was obtained and BRCA1 gene analysis was sent to [facility].  A copy of his sister's positive test report was also sent to the testing laboratory.  Results should be available in approximately 3-4 weeks.      We reviewed the implications of both positive and negative test result for Yogesh.  If testing comes back positive, Yogesh will be recommended to follow the screening recommendations for men with BRCA1 mutations as published by the National Comprehensive Cancer Network (NCCN). If  testing comes back negative, Yogesh would not be at an increased risk for BRCA1-associated cancers.  We also discussed that Yogesh's results would also help determine the risk to his children carrying this mutation.  These recommendations will be discussed in detail once genetic testing is completed.      Plan:  1) Today Yogesh elected to proceed with BRCA1 genetic testing through [facility].  2) This information should be available in 3-4 weeks.  3) Yogesh will be contacted to schedule a return genetic counseling appointment to discuss his results.      Face to face time: 40 minutes    Faye Mccarthy MS, [facility]  Licensed Genetic Counselor  [phone]                Again, thank you for allowing me to participate in the care of your patient.        Sincerely,        Faye Mccarthy, GC

## 2019-01-17 NOTE — LETTER
"    Cancer Risk Management  Program [facility]  Mailing Address  Cancer Risk Management Program  [facility]  [address]  [address]  [place] [address]    New patient appointments  [phone]  January 18, 2019    Yogesh BAR Annabelle  [address] [place] [address]      Dear Yogesh,    It was a pleasure meeting with you at the [facility] on 1/17/2019.  Here is a copy of the progress note from your recent genetic counseling visit to the Cancer Risk Management Program.  If you have any additional questions, please feel free to call.    Referring Provider: Tatiana Samano MD    Presenting Information:   I met with Yogesh Alanis today for genetic counseling at the Cancer Risk Management Program at the [facility] to discuss the known BRCA1 mutation in his family. Specifically, the familial BRCA1 mutation identified in his sister is a pathogenic deletion of exons 8-11. He is here today to review this history, cancer screening recommendations, and available genetic testing options.    Personal History:  Yogesh is a 43 year old male.  He does not have any personal history of cancer.  He is followed by his primary care provider regularly, and reports no other cancer screening at this time.  Yogesh reported no current tobacco use, and no concerns regarding alcohol use or environmental exposures.  He has three children, ages 11, 14 and 21.    Family History: (Please see scanned pedigree for detailed family history information)    One sister is 40 and recently completed genetic testing due to the family history of breast cancer which identified a pathogenic deletion of exons 8-11 in the BRCA1 gene.  Per her test report, this specific mutation is called \"c.(?_348)_(4185+1_?)del and is a " "common large rearrangement [place]  mutation. This testing, completed through the [facility] included the \"Breast Cancer Expanded Panel\" (HEENA, BARD1, BRCA1, BRCA2, BRIP1, CDH1, MRE11A, MUTYH, NBN, PALB2, PTEN, RAD50, RAD51C, RAD51D, STK11, TP53).  A copy of her positive test report was available today for review, and Andrey's sister gave verbal consent to access this test result to facilitate testing today.        His mother was diagnosed with breast cancer in her early 50's and is now 61.  She has not completed any genetic testing.  She reportedly has two maternal half sisters who were diagnosed with early onset breast cancer (one in her 40's, and one in her 30's-40's).  No known genetic testing has been completed    His maternal grandmother passed away in her 30's-40's due to breast cancer.      Yogesh's paternal family history is not significant for any reported cancers.     His maternal ethnicity is Faroese/Maltese/Bangladeshi/. His paternal ethnicity is Khmer/Uzbek Yolyn.  There is no known Ashkenazi Yarsanism ancestry on either side of his family.     Discussion:    Yogesh has a family history of breast cancer and an identified BRCA1 gene mutation.  Specifically, the mutation identified in his sister is called \"deletion of exons 8-11\".  We discussed that this gene mutation is consistent with a diagnosis of hereditary breast and ovarian cancer syndrome.  We discussed the impact of this testing on Yogesh and his family in detail.      We discussed the natural history and genetics of Hereditary Breast and Ovarian Cancer (HBOC) syndrome caused by mutations in the BRCA1 gene. A detailed handout regarding this cancer syndrome and the information we discussed was provided to Yogesh at the end of our appointment today and can be found in the after visit summary.  Topics included: inheritance pattern, cancer risks, cancer screening " recommendations, and also risks, benefits and limitations of testing.    We reviewed that mutations in the BRCA1 gene are inherited in an autosomal dominant pattern.  This means that Yogesh has a 50% chance of inheriting the same mutation which was identified in his sister. Yogesh's maternal family of several relatives diagnosed with early onset breast cancer is suggestive of this BRCA1 gene mutation, however testing has not yet been completed in his family to confirm this inheritance.     Based on his family history of breast cancer, Yogesh meets the National Comprehensive Cancer Network (NCCN) criteria for genetic testing of BRCA1 and BRCA2.    We discussed that there are additional genes that could cause increased risk for breast cancer.  As many of these genes present with overlapping features in a family and accurate cancer risk cannot always be established based upon the pedigree analysis alone, it would be reasonable for Yogesh to consider panel genetic testing to analyze multiple genes at once.  However, genetic testing for these additional breast cancer susceptibility genes is typically most informative when it is first performed on a family member with a personal history of breast cancer. In this situation, we discussed that Yogesh's mother would be the best person to pursue this testing.  Genetic counseling is recommended for his mother to discuss available testing options due to her significant personal and family history of breast cancer, including the BRCA1 gene mutation in their family.      We reviewed available genetic testing options for Yogesh, including single gene BRCA1 analysis and panel testing to include additional genes associated with breast cancer.  Yogesh expressed interest in only proceeding with testing for the BRCA1 gene at this time due to his sister's positive test result, and plans to follow up with his mother regarding the possibility of additional testing.      Consent was  obtained and BRCA1 gene analysis was sent to [facility].  A copy of his sister's positive test report was also sent to the testing laboratory.  Results should be available in approximately 3-4 weeks.      We reviewed the implications of both positive and negative test result for Yogesh.  If testing comes back positive, Yogesh will be recommended to follow the screening recommendations for men with BRCA1 mutations as published by the National Comprehensive Cancer Network (NCCN). If testing comes back negative, Yogesh would not be at an increased risk for BRCA1-associated cancers.  We also discussed that Yogesh's results would also help determine the risk to his children carrying this mutation.  These recommendations will be discussed in detail once genetic testing is completed.      Plan:  1) Today Yogesh elected to proceed with BRCA1 genetic testing through [facility].  2) This information should be available in 3-4 weeks.  3) Yogesh will be contacted to schedule a return genetic counseling appointment to discuss his results.      Faye Mccarthy MS, [facility]  Licensed Genetic Counselor  [phone]

## 2019-01-17 NOTE — PATIENT INSTRUCTIONS
Assessing Cancer Risk  Only about 5-10% of cancers are thought to be due to an inherited cancer susceptibility gene.    These families often have:    Several people with the same or related types of cancer    Cancers diagnosed at a young age (before age 50)    Individuals with more than one primary cancer    Multiple generations of the family affected with cancer    BRCA1 and BRCA2 Genes  We each inherit two copies of every gene in our bodies: one from our mother, and one from our father.  Each gene has a specific job to do.  When a gene has a mistake or  mutation  in it, it does not work like it should.  Everyone has two copies of BRCA1 and two copies of BRCA2.  A single mutation in one of the copies of BRCA1 or BRCA2 increases the risk for breast and ovarian cancer, among others.  The risk for pancreatic cancer and melanoma may also be slightly increased in some families.  The tables below list the chance that someone with a BRCA mutation would develop cancer in his or her lifetime1,2,3,4.      Women   Men    General Population BRCA +   General Population BRCA +   Breast 12% 40-85%  Breast <1% ~7%   Ovarian 1-2% 10-40%  Prostate 16% 20%     Inheriting a mutation does not mean a person will develop cancer, but it does significantly increase his or her risk above the general population risk.    A person s ethnic background is also important to consider, as individuals of Ashkenazi Religious ancestry have a higher chance of having a BRCA gene mutation.  There are three BRCA mutations that occur more frequently in this population.     Genetic Testing  Genetic testing involves a simple blood test and will look at the genetic information in the BRCA1 and BRCA2 genes for any harmful mutations that are associated with increased cancer risk.  If possible, it is recommended that the person(s) who has had cancer be tested before other family members.  That person will give us the most useful information about whether or not  a specific gene is associated with the cancer in the family.     Results  There are three possible results of BRCA1 and BRCA2 genetic testing:    Positive--a harmful mutation was identified    Negative--no mutation was identified    Variant of unknown significance--a variation in one of the genes was identified, but it is unclear how this impacts cancer risk in the family  Advantages and Disadvantages  There are advantages and disadvantages to genetic testing of these genes.    Advantages    May clarify your cancer risk    Can help you make medical decisions    May explain the cancers in your family    May give useful information to your family members (if you share your results)    Disadvantages    Possible negative emotional impact of learning about inherited cancer risk    Uncertainty in interpreting a negative test result in some situations    Possible genetic discrimination concerns (see below)    Inheritance   BRCA1 and BRCA2 mutations are inherited in an autosomal dominant pattern.  This means that if a parent has a mutation, each of his or her children will have a 50% chance of inheriting that same mutation.  Therefore, each child--male or female--would have a 50% chance of being at increased risk for developing cancer.                                              Image obtained from Genetics Home Reference, 2013     Genetic Information Nondiscrimination Act (TAVARES)  TAVARES is a federal law that protects individuals from health insurance or employment discrimination based on a genetic test result alone.  Although rare, there are currently no legal protections in terms of life insurance, long term care, or disability insurances.  Visit the National Human Genome Research [place] at Genome.gov/25544747 to learn more.    Reducing Cancer Risk  Current screening recommendations for women with a BRCA mutation include1:    Breast:  o Breast awareness starting at age 18  o Clinical breast exams starting at age 25;  repeated every 6-12 months  o Annual breast MRI starting at age 25 (or possibly younger)  o Annual mammogram (consider tomosynthesis) and breast MRI at age 30 (for women with and without a breast cancer history)  o Consideration of preventative mastectomy    Ovarian:   o Consideration of transvaginal ultrasound and CA-125 blood test starting at age 30 (or possibly younger); repeated every 6 months  o Recommendation for surgery to remove the ovaries and fallopian tubes after child bearing or by age 35-40. Women with BRCA2 mutations may delay this surgery until ages 40-45, unless it is warranted to consider sooner based on family history.    Some data suggests that women with BRCA1 mutations may be at slightly higher risk for serous uterine cancer. Information is limited at this time, and further research is needed to better understand this risk. Women with BRCA1 mutations should discuss the risks and benefits of hysterectomy at the time of ovary removal.     Current screening recommendations for men with a BRCA mutation include1:    Breast:  o Self-breast exams starting at age 35  o Annual clinical breast exams starting at age 35    Prostate:   o Recommend prostate cancer screening starting at age 45 for BRCA2 carriers  o Consider prostate cancer screening starting at age 45 for BRCA1 carriers    Both men and women with BRCA mutations should talk to their doctor or genetic counselor about screening for pancreatic cancer and melanoma.  In addition, the age at which to start screening may be modified based on family history of young cancer.    Questions to Think About Regarding Genetic Testing    What effect will the test result have on me and my relationship with my family members if I have an inherited gene mutation?  If I don t have a gene mutation?    Should I share my test results, and how will my family react to this news, which may also affect them?    Are my children ready to learn new information that may one  day affect their own health?    Resources  FORCE: Facing Our Risk of Cancer Empowered facingourrisk.org   Bright Pink bebrightpink.org   American Cancer Society (ACS) cancer.org   National Cancer [place] (NCI) cancer.gov     Please call us if you have any questions or concerns.   Cancer Risk Management Program [phone] ([phone])  ? Michelle Prasad, MS, [facility]  [phone]  ? Ginger Willis, MS, [facility]  [phone]  ? Faye Mccarthy, MS, [facility]  [phone]  ? Rowdy Membreno, MS, [facility]  [phone]  ? Lilian Be, MS, [facility] [phone]    References:  1. National Comprehensive Cancer Network. Clinical practice guidelines in oncology, colorectal cancer screening. Available online (registration required). 2019.

## 2019-02-08 LAB — LAB SCANNED RESULT: NORMAL

## 2019-02-14 ENCOUNTER — VIRTUAL VISIT (OUTPATIENT)
Dept: ONCOLOGY | Facility: CLINIC | Age: 44
End: 2019-02-14
Attending: GENETIC COUNSELOR, MS
Payer: COMMERCIAL

## 2019-02-14 DIAGNOSIS — Z80.3 FAMILY HISTORY OF MALIGNANT NEOPLASM OF BREAST: ICD-10-CM

## 2019-02-14 DIAGNOSIS — Z84.81 FAMILY HISTORY OF BRCA1 GENE POSITIVE: Primary | ICD-10-CM

## 2019-02-14 NOTE — LETTER
Cancer Risk Management  Program Locations    [facility]  [facility]  Mailing Address  Cancer Risk Management Program  [facility]  [address]  [place] [address]    New patient appointments  [phone]  February 14, 2019    Yogesh Alanis  [address] [place] [address]      Dear Yogesh,    It was a pleasure speaking with you on the phone on 2/14/2019.  Here is a copy of the progress note from our discussion.  If you have any additional questions, please feel free to call.    Referring Provider: Tatiana Samano MD    Presenting Information:  I spoke to Yogesh by phone today to discuss his genetic testing results. His blood was drawn on 1/17/2019. BRCA1 testing was ordered from [facility]. This testing was done because of Yogesh's family history of a BRCA1 gene mutation.    Genetic Testing Results: NEGATIVE   Yogesh's test results were negative. The mutation that was identified in his sister was in the BRCA1 gene. Specifically this mutation is called deletion of exons 8-11 (c.(?_548)_(3205+1_?)del). Yogesh does not have the mutation previously identified in his family. No mutations or variants of unknown significance were identified in the BRCA1 gene.  This test only looked at this one gene.    Interpretation:  Based on this test result, Yogesh does not have Hereditary Breast and Ovarian Cancer syndrome caused by the familial BRCA1 gene mutation and is likely not at an increased risk for the associated cancers. Even though he does not have the familial BRCA1 mutation, he is still at general population lifetime risk for the BRCA1-associated cancers.  The general population risk for prostate cancer is approximately 11-16%, and less than 1% for male breast cancer.  Screening recommendations  should be discussed with his managing physician.             Screening:  We discussed the importance of cancer screening based on Yogesh's personal and family history.  Population cancer screening options, such as those recommended by the American Cancer Society and the National Comprehensive Cancer Network (NCCN), are appropriate for Yogesh at this time. These screening recommendations may change if there are changes to Yogesh's personal and/or family history. Final screening recommendations should be made by each individual's managing physician.    Inheritance:  We reviewed the autosomal dominant inheritance of this BRCA1 mutation. We discussed that Yogesh cannot pass on this mutation to his children based on this test result.  Mutations in this gene do not skip generations.      Additional Testing Considerations:  Although Yogesh's genetic testing result was negative, other relatives may still carry the familial BRCA1 mutation and/or a different gene mutation associated with breast cancer. Genetic counseling is recommended for his mother and older sister to discuss genetic testing options. If any of these relatives do pursue genetic testing, Yogesh is encouraged to contact me so that we may review the impact of their test results on him and his family.    Plan:  1. I will send Yogesh a copy of his negative BRCA1 test report by mail as well as my contact information should other family members want to consider genetic testing.   2. Yogesh is encouraged to contact me annually and/or with any changes to his personal/family history, as this information may inform future cancer screening or genetic testing recommendations.  3. If there are any further questions or concerns, he should not hesitate to contact me at [phone].    Faye Mccarthy MS, [facility]  Licensed Genetic Counselor  [phone]

## 2019-02-14 NOTE — Clinical Note
Please print and send a copy of this letter and the patient's genetic testing report to the patient.    Please enclose test report: Send outs misc test [[id number]] (Order [id number])

## 2019-02-14 NOTE — PROGRESS NOTES
"2/14/2019    Referring Provider: Tatiana Samano MD    Presenting Information:  I spoke to Yogesh by phone today to discuss his genetic testing results. His blood was drawn on 1/17/2019. BRCA1 testing was ordered from [facility]. This testing was done because of Yogesh's family history of a BRCA1 gene mutation.    Genetic Testing Results: NEGATIVE   Yogesh's test results were negative. The mutation that was identified in his sister was in the BRCA1 gene. Specifically this mutation is called deletion of exons 8-11 (c.(?_548)_(4185+1_?)del). Yogesh does not have the mutation previously identified in his family. No mutations or variants of unknown significance were identified in the BRCA1 gene.  This test only looked at this one gene.    A copy of the test report can be found in the Laboratory tab, dated 1/17/2019, and named \"[facility]". The report is scanned in as a linked document.    Interpretation:  Based on this test result, Yogesh does not have Hereditary Breast and Ovarian Cancer syndrome caused by the familial BRCA1 gene mutation and is likely not at an increased risk for the associated cancers. Even though he does not have the familial BRCA1 mutation, he is still at general population lifetime risk for the BRCA1-associated cancers.  The general population for prostate cancer is approximately 16%, and less than 1% for male breast cancer.  Screening recommendations should be discussed with his managing physician.             Screening:  We discussed the importance of cancer screening based on Yogesh's personal and family history.  Population cancer screening options, such as those recommended by the American Cancer Society and the National Comprehensive Cancer Network (NCCN), are appropriate for Yogesh at this time. These screening recommendations may change if there are changes to Yogesh's personal and/or family history. Final screening recommendations should be made by each " individual's managing physician.    Inheritance:  We reviewed the autosomal dominant inheritance of this BRCA1 mutation. We discussed that Yogesh cannot pass on this mutation to his children based on this test result.  Mutations in this gene do not skip generations.      Additional Testing Considerations:  Although Yogesh's genetic testing result was negative, other relatives may still carry the familial BRCA1 mutation and/or a different gene mutation associated with breast cancer. Genetic counseling is recommended for his mother and older sister to discuss genetic testing options. If any of these relatives do pursue genetic testing, Yogesh is encouraged to contact me so that we may review the impact of their test results on him and his family.    Plan:  1. I will send Yogesh a copy of his negative BRCA1 test report by mail as well as my contact information should other family members want to consider genetic testing.   2. Yogesh is encouraged to contact me annually and/or with any changes to his personal/family history, as this information may inform future cancer screening or genetic testing recommendations.  3. If there are any further questions or concerns, he should not hesitate to contact me at [phone].    Time spent: 5 minutes    Faye Mccarthy MS, [facility]  Licensed Genetic Counselor  [phone]

## 2019-04-18 ENCOUNTER — OFFICE VISIT (OUTPATIENT)
Dept: FAMILY MEDICINE | Facility: CLINIC | Age: 44
End: 2019-04-18
Payer: COMMERCIAL

## 2019-04-18 VITALS — SYSTOLIC BLOOD PRESSURE: 114 MMHG | HEART RATE: 60 BPM | DIASTOLIC BLOOD PRESSURE: 78 MMHG | RESPIRATION RATE: 16 BRPM

## 2019-04-18 DIAGNOSIS — R10.12 LUQ PAIN: Primary | ICD-10-CM

## 2019-04-18 LAB
ALBUMIN SERPL-MCNC: 4.4 G/DL (ref 3.4–5)
ALP SERPL-CCNC: 51 U/L (ref 40–150)
ALT SERPL W P-5'-P-CCNC: 46 U/L (ref 0–70)
ANION GAP SERPL CALCULATED.3IONS-SCNC: 6 MMOL/L (ref 3–14)
AST SERPL W P-5'-P-CCNC: 22 U/L (ref 0–45)
BILIRUB SERPL-MCNC: 0.5 MG/DL (ref 0.2–1.3)
BUN SERPL-MCNC: 21 MG/DL (ref 7–30)
CALCIUM SERPL-MCNC: 9.3 MG/DL (ref 8.5–10.1)
CHLORIDE SERPL-SCNC: 106 MMOL/L (ref 94–109)
CO2 SERPL-SCNC: 26 MMOL/L (ref 20–32)
CREAT SERPL-MCNC: 0.94 MG/DL (ref 0.66–1.25)
GFR SERPL CREATININE-BSD FRML MDRD: >90 ML/MIN/{1.73_M2}
GLUCOSE SERPL-MCNC: 99 MG/DL (ref 70–99)
POTASSIUM SERPL-SCNC: 4.4 MMOL/L (ref 3.4–5.3)
PROT SERPL-MCNC: 7.9 G/DL (ref 6.8–8.8)
SODIUM SERPL-SCNC: 138 MMOL/L (ref 133–144)

## 2019-04-18 PROCEDURE — 36415 COLL VENOUS BLD VENIPUNCTURE: CPT | Performed by: FAMILY MEDICINE

## 2019-04-18 PROCEDURE — 99213 OFFICE O/P EST LOW 20 MIN: CPT | Performed by: FAMILY MEDICINE

## 2019-04-18 PROCEDURE — 80053 COMPREHEN METABOLIC PANEL: CPT | Performed by: FAMILY MEDICINE

## 2019-04-18 RX ORDER — LANSOPRAZOLE 30 MG/1
30 CAPSULE, DELAYED RELEASE ORAL
Qty: 90 CAPSULE | Refills: 3 | Status: SHIPPED | OUTPATIENT
Start: 2019-04-18 | End: 2021-06-01

## 2019-04-18 RX ORDER — LANSOPRAZOLE 30 MG/1
30 CAPSULE, DELAYED RELEASE ORAL
COMMUNITY
Start: 2015-12-09 | End: 2019-04-18

## 2019-04-18 RX ORDER — CROMOLYN SODIUM 40 MG/ML
1 SOLUTION/ DROPS OPHTHALMIC 4 TIMES DAILY
Qty: 1 BOTTLE | Refills: 5 | Status: SHIPPED | OUTPATIENT
Start: 2019-04-18 | End: 2021-06-01

## 2019-04-18 ASSESSMENT — PAIN SCALES - GENERAL: PAINLEVEL: NO PAIN (1)

## 2019-04-18 NOTE — PROGRESS NOTES
SUBJECTIVE:   Yogesh Alanis is a 43 year old male who presents to clinic today for the following health issues:      HPI     Patient presents today for follow up of his LUQ pain.  He has had intermittent pain in the same spot just below his LEFT ribcage midclavicularly that intensifies then resolves.  He had imaging and labs  with normal findings.  He feels pain is back and is concerned something might be festering in this area.    He is otherwise a healthy 43 year old male and [employer] .  He has a very active job and is in excellent physical condition.  He does not remember any trauma or injury to this area.    Denies any N/V, change in bowels, jaundice, unexplained weight loss.  The pain does not cause him to miss work.    Additional history: as documented    Reviewed and updated as needed this visit by clinical staff  Tobacco  Allergies  Meds  Med Hx  Surg Hx  Fam Hx  Soc Hx        Reviewed and updated as needed this visit by Provider             Patient Active Problem List   Diagnosis     Environmental allergies     CARDIOVASCULAR SCREENING; LDL GOAL LESS THAN 160     Gastroesophageal reflux disease without esophagitis     Past Surgical History:   Procedure Laterality Date     C APPENDECTOMY       HC REPAIR ACHILLES TENDON,PRIMARY      left     SURGICAL HISTORY OF -       bilateral knee arthroscopies       Social History     Tobacco Use     Smoking status: Former Smoker     Last attempt to quit: 1999     Years since quittin.9     Smokeless tobacco: Never Used   Substance Use Topics     Alcohol use: Yes     Comment: social     Family History   Problem Relation Age of Onset     Breast Cancer Mother      C.A.D. Paternal Grandmother      Asthma Sister      Diabetes No family hx of      Hypertension No family hx of      Cerebrovascular Disease No family hx of      Cancer - colorectal No family hx of      Prostate Cancer No family hx of      Alcohol/Drug No family hx of           Current Outpatient Medications   Medication Sig Dispense Refill     Ascorbic Acid (VITAMIN C PO) Take 250 mg by mouth daily       cromolyn (OPTICROM) 4 % ophthalmic solution Place 1 drop into both eyes 4 times daily 1 Bottle 5     GARLIC PO 3 capsules a day       LANsoprazole (PREVACID) 30 MG DR capsule Take 1 capsule (30 mg) by mouth every morning (before breakfast) 90 capsule 3     Multiple Vitamin (MULTIVITAMIN OR) Take  by mouth.       Allergies   Allergen Reactions     Amoxicillin Other (See Comments)     High blood pressure   Hypertension and high blood pressure     Hydrocodone-Acetaminophen      Precipitates migraine headaches     Recent Labs   Lab Test 04/18/19  1036 11/06/17  1449 12/17/14  0912  02/28/13  1610   LDL  --   --  76  --  79   HDL  --   --  52  --  44   TRIG  --   --  91  --  63   ALT 46 41 34   < >  --    CR 0.94 1.01 0.99   < >  --    GFRESTIMATED >90 81 84   < >  --    GFRESTBLACK >90 >90 >90  African American GFR Calc     < >  --    POTASSIUM 4.4 4.0 4.3   < >  --    TSH  --   --  2.01  --   --     < > = values in this interval not displayed.      BP Readings from Last 3 Encounters:   04/18/19 114/78   12/03/18 128/80   11/12/18 124/78    Wt Readings from Last 3 Encounters:   12/03/18 87.5 kg (193 lb)   11/12/18 84.4 kg (186 lb)   11/01/17 93 kg (205 lb)                    ROS:  Constitutional, HEENT, cardiovascular, pulmonary, gi and gu systems are negative, except as otherwise noted.    OBJECTIVE:     /78   Pulse 60   Resp 16   There is no height or weight on file to calculate BMI.  GENERAL: healthy, alert and no distress  EYES: Eyes grossly normal to inspection, PERRL and conjunctivae and sclerae normal  NECK: no adenopathy, no asymmetry, masses, or scars and thyroid normal to palpation  ABDOMEN: soft, nontender, no hepatosplenomegaly, no masses and bowel sounds normal  MS: no gross musculoskeletal defects noted, no edema  SKIN: no suspicious lesions or rashes  NEURO: Normal  strength and tone, mentation intact and speech normal  PSYCH: mentation appears normal, affect normal/bright    ASSESSMENT/PLAN:     1. LUQ pain    - LANsoprazole (PREVACID) 30 MG DR capsule; Take 1 capsule (30 mg) by mouth every morning (before breakfast)  Dispense: 90 capsule; Refill: 3  - Comprehensive metabolic panel  - CT Abdomen Pelvis w Contrast; Future    Reviewed previous scan and labs.  Pain has persisted with no change since last eval.  Advised recheck of labs again today with repeat scan-- if all again is normal- we discussed that we can assume it is musculoskeletal and not worry unless new symptoms accompany the pain in the future.  Patient in agreement with the plan.    Tatiana Samano MD  [facility]

## 2019-04-22 ENCOUNTER — ANCILLARY PROCEDURE (OUTPATIENT)
Dept: CT IMAGING | Facility: CLINIC | Age: 44
End: 2019-04-22
Attending: FAMILY MEDICINE
Payer: COMMERCIAL

## 2019-04-22 DIAGNOSIS — R10.12 LUQ PAIN: ICD-10-CM

## 2019-04-22 RX ORDER — IOPAMIDOL 755 MG/ML
119 INJECTION, SOLUTION INTRAVASCULAR ONCE
Status: COMPLETED | OUTPATIENT
Start: 2019-04-22 | End: 2019-04-22

## 2019-04-22 RX ADMIN — IOPAMIDOL 119 ML: 755 INJECTION, SOLUTION INTRAVASCULAR at 14:02

## 2019-04-22 NOTE — DISCHARGE INSTRUCTIONS

## 2019-06-13 ENCOUNTER — OFFICE VISIT (OUTPATIENT)
Dept: FAMILY MEDICINE | Facility: CLINIC | Age: 44
End: 2019-06-13
Payer: COMMERCIAL

## 2019-06-13 VITALS
SYSTOLIC BLOOD PRESSURE: 121 MMHG | HEART RATE: 65 BPM | DIASTOLIC BLOOD PRESSURE: 76 MMHG | TEMPERATURE: 97 F | OXYGEN SATURATION: 97 % | RESPIRATION RATE: 16 BRPM

## 2019-06-13 DIAGNOSIS — J06.9 UPPER RESPIRATORY TRACT INFECTION, UNSPECIFIED TYPE: Primary | ICD-10-CM

## 2019-06-13 PROCEDURE — 99213 OFFICE O/P EST LOW 20 MIN: CPT | Performed by: FAMILY MEDICINE

## 2019-06-13 ASSESSMENT — PAIN SCALES - GENERAL: PAINLEVEL: MODERATE PAIN (4)

## 2019-06-13 NOTE — PROGRESS NOTES
Subjective     Yogesh Alanis is a 43 year old male who presents to clinic today for the following health issues:    HPI     Hx of seasonal allergies- has not been using OTC antihistamine yet this year.  Presents with increased dry cough x 3 weeks- states normally cough does not last this long into .  No fever or chills.  Nonproductive.  No sick contacts.      Patient Active Problem List   Diagnosis     Environmental allergies     CARDIOVASCULAR SCREENING; LDL GOAL LESS THAN 160     Gastroesophageal reflux disease without esophagitis     Past Surgical History:   Procedure Laterality Date     C APPENDECTOMY       HC REPAIR ACHILLES TENDON,PRIMARY      left     SURGICAL HISTORY OF -       bilateral knee arthroscopies       Social History     Tobacco Use     Smoking status: Former Smoker     Last attempt to quit: 1999     Years since quittin.1     Smokeless tobacco: Never Used   Substance Use Topics     Alcohol use: Yes     Comment: social     Family History   Problem Relation Age of Onset     Breast Cancer Mother      C.A.D. Paternal Grandmother      Asthma Sister      Diabetes No family hx of      Hypertension No family hx of      Cerebrovascular Disease No family hx of      Cancer - colorectal No family hx of      Prostate Cancer No family hx of      Alcohol/Drug No family hx of          Current Outpatient Medications   Medication Sig Dispense Refill     Ascorbic Acid (VITAMIN C PO) Take 250 mg by mouth daily       cromolyn (OPTICROM) 4 % ophthalmic solution Place 1 drop into both eyes 4 times daily 1 Bottle 5     GARLIC PO 3 capsules a day       LANsoprazole (PREVACID) 30 MG DR capsule Take 1 capsule (30 mg) by mouth every morning (before breakfast) 90 capsule 3     Multiple Vitamin (MULTIVITAMIN OR) Take  by mouth.       Allergies   Allergen Reactions     Amoxicillin Other (See Comments)     High blood pressure   Hypertension and high blood pressure     Hydrocodone-Acetaminophen      Precipitates  migraine headaches     Recent Labs   Lab Test 04/18/19  1036 11/06/17  1449 12/17/14  0912  02/28/13  1610   LDL  --   --  76  --  79   HDL  --   --  52  --  44   TRIG  --   --  91  --  63   ALT 46 41 34   < >  --    CR 0.94 1.01 0.99   < >  --    GFRESTIMATED >90 81 84   < >  --    GFRESTBLACK >90 >90 >90  African American GFR Calc     < >  --    POTASSIUM 4.4 4.0 4.3   < >  --    TSH  --   --  2.01  --   --     < > = values in this interval not displayed.      BP Readings from Last 3 Encounters:   06/13/19 121/76   04/18/19 114/78   12/03/18 128/80    Wt Readings from Last 3 Encounters:   12/03/18 87.5 kg (193 lb)   11/12/18 84.4 kg (186 lb)   11/01/17 93 kg (205 lb)                    Reviewed and updated as needed this visit by Provider         Review of Systems   ROS COMP: Constitutional, HEENT, cardiovascular, pulmonary, gi and gu systems are negative, except as otherwise noted.      Objective    /76   Pulse 65   Temp 97  F (36.1  C) (Oral)   Resp 16   SpO2 97%   There is no height or weight on file to calculate BMI.  Physical Exam   GENERAL: healthy, alert and no distress  EYES: Eyes grossly normal to inspection, PERRL and conjunctivae and sclerae normal  HENT: ear canals and TM's normal, nose and mouth without ulcers or lesions, swollen anterior turbinates with clear rhinorrhea, + postnasal drip  NECK: no adenopathy, no asymmetry, masses, or scars and thyroid normal to palpation  RESP: lungs clear to auscultation - no rales, rhonchi or wheezes  CV: regular rate and rhythm, normal S1 S2, no S3 or S4, no murmur, click or rub, no peripheral edema and peripheral pulses strong  MS: no gross musculoskeletal defects noted, no edema  SKIN: no suspicious lesions or rashes  PSYCH: mentation appears normal, affect normal/bright          Assessment & Plan     1. Upper respiratory tract infection, unspecified type    Begin loratadine 10 mg bid to help with seasonal allergies and to reduce postnasal drip  triggering cough.  Follow-up if no change or worsening symptoms.    Tatiana Samano MD  [facility]

## 2019-11-07 ENCOUNTER — HEALTH MAINTENANCE LETTER (OUTPATIENT)
Age: 44
End: 2019-11-07

## 2020-09-24 ENCOUNTER — OFFICE VISIT (OUTPATIENT)
Dept: FAMILY MEDICINE | Facility: CLINIC | Age: 45
End: 2020-09-24
Payer: COMMERCIAL

## 2020-09-24 VITALS
HEIGHT: 73 IN | OXYGEN SATURATION: 98 % | BODY MASS INDEX: 26.51 KG/M2 | SYSTOLIC BLOOD PRESSURE: 118 MMHG | RESPIRATION RATE: 16 BRPM | TEMPERATURE: 97.9 F | DIASTOLIC BLOOD PRESSURE: 80 MMHG | WEIGHT: 200 LBS | HEART RATE: 86 BPM

## 2020-09-24 DIAGNOSIS — M70.21 OLECRANON BURSITIS OF RIGHT ELBOW: Primary | ICD-10-CM

## 2020-09-24 PROCEDURE — 99213 OFFICE O/P EST LOW 20 MIN: CPT | Performed by: FAMILY MEDICINE

## 2020-09-24 RX ORDER — CEPHALEXIN 500 MG/1
500 CAPSULE ORAL 3 TIMES DAILY
Qty: 30 CAPSULE | Refills: 0 | Status: SHIPPED | OUTPATIENT
Start: 2020-09-24 | End: 2020-10-04

## 2020-09-24 ASSESSMENT — MIFFLIN-ST. JEOR: SCORE: 1846.07

## 2020-09-25 ENCOUNTER — TELEPHONE (OUTPATIENT)
Dept: FAMILY MEDICINE | Facility: CLINIC | Age: 45
End: 2020-09-25

## 2020-09-25 DIAGNOSIS — M70.21 OLECRANON BURSITIS OF RIGHT ELBOW: ICD-10-CM

## 2020-09-25 DIAGNOSIS — M70.21 OLECRANON BURSITIS OF RIGHT ELBOW: Primary | ICD-10-CM

## 2020-09-25 RX ORDER — SULFAMETHOXAZOLE/TRIMETHOPRIM 800-160 MG
1 TABLET ORAL 2 TIMES DAILY
Qty: 20 TABLET | Refills: 0 | Status: SHIPPED | OUTPATIENT
Start: 2020-09-25 | End: 2020-10-05

## 2020-09-25 NOTE — TELEPHONE ENCOUNTER
Pt called-States he was prescribed Keflex but has allergy to PCN. Would like an alternative. Elba Conroy RN

## 2020-09-25 NOTE — TELEPHONE ENCOUNTER
Please let Bharath know Keflex is not a PCN and has < 5%% chance of allergic reaction for those that have a PCN allergy.  It is okay to take if he has picked it up.    If he does not feel comfortable with that- I have sent in sulfa instead for him to his [facility] in [place].

## 2020-09-27 NOTE — PROGRESS NOTES
"Subjective         HPI   Presents with a recurrent olecranon bursitis of RIGHT elbow which he noticed today.  Has been on and off x 2 months- using a compression sleeve to manage- today it was red and sore and hurt to touch.  [place] - works out daily.    Reviewed and updated as needed this visit by Provider         Review of Systems   ROS COMP: Constitutional, HEENT, cardiovascular, pulmonary, GI, , musculoskeletal, neuro, skin, endocrine and psych systems are negative, except as otherwise noted.      Objective    /80 (BP Location: Left arm, Patient Position: Sitting, Cuff Size: Adult Regular)   Pulse 86   Temp 97.9  F (36.6  C) (Temporal)   Resp 16   Ht 1.854 m (6' 1\")   Wt 90.7 kg (200 lb)   SpO2 98%   BMI 26.39 kg/m      Physical Exam   GENERAL: healthy, alert and no distress  EYES: Eyes grossly normal to inspection, PERRL and conjunctivae and sclerae normal  MS: no gross musculoskeletal defects noted, erythematous RIGHT swollen olecranon with small fluid collection noted, no drainage  SKIN: no suspicious lesions or rashes  NEURO: Normal strength and tone, mentation intact and speech normal  PSYCH: mentation appears normal, affect normal/bright        Assessment & Plan     1. Olecranon bursitis of right elbow    - cephALEXin (KEFLEX) 500 MG capsule; Take 1 capsule (500 mg) by mouth 3 times daily for 10 days  Dispense: 30 capsule; Refill: 0  - diclofenac (VOLTAREN) 1 % topical gel; Place 2 g onto the skin 4 times daily  Dispense: 350 g; Refill: 1     Recommend treating with a course of antibiotics first along with NSAIDs, ICE and compression sleeve.  If no change after infection clears- can consider drawing out fluid to treat prn.      Tatiana Samano MD  [facility]    "

## 2020-10-15 ENCOUNTER — OFFICE VISIT (OUTPATIENT)
Dept: FAMILY MEDICINE | Facility: CLINIC | Age: 45
End: 2020-10-15
Payer: COMMERCIAL

## 2020-10-15 VITALS
BODY MASS INDEX: 27.84 KG/M2 | WEIGHT: 211 LBS | TEMPERATURE: 96.5 F | HEART RATE: 62 BPM | DIASTOLIC BLOOD PRESSURE: 76 MMHG | RESPIRATION RATE: 16 BRPM | OXYGEN SATURATION: 99 % | SYSTOLIC BLOOD PRESSURE: 124 MMHG

## 2020-10-15 DIAGNOSIS — M70.21 OLECRANON BURSITIS OF RIGHT ELBOW: Primary | ICD-10-CM

## 2020-10-15 PROCEDURE — 99213 OFFICE O/P EST LOW 20 MIN: CPT | Performed by: FAMILY MEDICINE

## 2020-10-15 NOTE — PROGRESS NOTES
Subjective         HPI   Presents with chronic LEFT olecranon bursitis over last 3-6 months.  Fluid will resolve then recur after simple activities like throwing a football or pressure to site.  He has been wearing a compression sleeve on and off with good results when using it.  When he goes without- bursitis recurs.  Works as [employer].  Feels this is beginning to interfere with his job and workouts.      Patient Active Problem List   Diagnosis     Environmental allergies     CARDIOVASCULAR SCREENING; LDL GOAL LESS THAN 160     Gastroesophageal reflux disease without esophagitis     Past Surgical History:   Procedure Laterality Date     C APPENDECTOMY       HC REPAIR ACHILLES TENDON,PRIMARY      left     SURGICAL HISTORY OF -       bilateral knee arthroscopies       Social History     Tobacco Use     Smoking status: Former Smoker     Quit date: 1999     Years since quittin.4     Smokeless tobacco: Never Used   Substance Use Topics     Alcohol use: Yes     Comment: social     Family History   Problem Relation Age of Onset     Breast Cancer Mother      C.A.D. Paternal Grandmother      Asthma Sister      Diabetes No family hx of      Hypertension No family hx of      Cerebrovascular Disease No family hx of      Cancer - colorectal No family hx of      Prostate Cancer No family hx of      Alcohol/Drug No family hx of          Current Outpatient Medications   Medication Sig Dispense Refill     Ascorbic Acid (VITAMIN C PO) Take 250 mg by mouth daily       GARLIC PO 3 capsules a day       Multiple Vitamin (MULTIVITAMIN OR) Take  by mouth.       cromolyn (OPTICROM) 4 % ophthalmic solution Place 1 drop into both eyes 4 times daily (Patient not taking: Reported on 10/15/2020) 1 Bottle 5     diclofenac (VOLTAREN) 1 % topical gel Place 2 g onto the skin 4 times daily (Patient not taking: Reported on 10/15/2020) 350 g 1     LANsoprazole (PREVACID) 30 MG DR capsule Take 1 capsule (30 mg) by mouth every  morning (before breakfast) (Patient not taking: Reported on 10/15/2020) 90 capsule 3     Allergies   Allergen Reactions     Amoxicillin Other (See Comments)     High blood pressure   Hypertension and high blood pressure     Hydrocodone-Acetaminophen      Precipitates migraine headaches     Recent Labs   Lab Test 04/18/19  1036 11/06/17  1449 12/17/14  0912 02/28/13  1610 02/28/13  1610   LDL  --   --  76  --  79   HDL  --   --  52  --  44   TRIG  --   --  91  --  63   ALT 46 41 34   < >  --    CR 0.94 1.01 0.99   < >  --    GFRESTIMATED >90 81 84   < >  --    GFRESTBLACK >90 >90 >90  African American GFR Calc     < >  --    POTASSIUM 4.4 4.0 4.3   < >  --    TSH  --   --  2.01  --   --     < > = values in this interval not displayed.      BP Readings from Last 3 Encounters:   10/15/20 124/76   09/24/20 118/80   06/13/19 121/76    Wt Readings from Last 3 Encounters:   10/15/20 95.7 kg (211 lb)   09/24/20 90.7 kg (200 lb)   12/03/18 87.5 kg (193 lb)                    Reviewed and updated as needed this visit by Provider         Review of Systems   ROS COMP: Constitutional, HEENT, cardiovascular, pulmonary, GI, , musculoskeletal, neuro, skin, endocrine and psych systems are negative, except as otherwise noted.      Objective    /76   Pulse 62   Temp 96.5  F (35.8  C) (Tympanic)   Resp 16   Wt 95.7 kg (211 lb)   SpO2 99%   BMI 27.84 kg/m      Physical Exam   GENERAL: healthy, alert and no distress  EYES: Eyes grossly normal to inspection, PERRL and conjunctivae and sclerae normal  MS: no gross musculoskeletal defects noted, no edema, scab now over RIGHT olecranon- minimal fluctuance to chronic bursitis- no further redness or pain s/p Abx  SKIN: no suspicious lesions or rashes  NEURO: Normal strength and tone, mentation intact and speech normal  PSYCH: mentation appears normal, affect normal/bright        Assessment & Plan     1. Olecranon bursitis of right elbow    - Orthopedic & Spine  Referral;  Future     Referred to ORTHO to discuss interventions to prevent recurrence.  Continues with compression elbow pad to area prn.    Tatiana Samano MD  [facility]

## 2020-10-20 ENCOUNTER — TRANSFERRED RECORDS (OUTPATIENT)
Dept: HEALTH INFORMATION MANAGEMENT | Facility: CLINIC | Age: 45
End: 2020-10-20

## 2020-11-29 ENCOUNTER — HEALTH MAINTENANCE LETTER (OUTPATIENT)
Age: 45
End: 2020-11-29

## 2021-01-12 ENCOUNTER — OFFICE VISIT (OUTPATIENT)
Dept: FAMILY MEDICINE | Facility: CLINIC | Age: 46
End: 2021-01-12
Payer: COMMERCIAL

## 2021-01-12 VITALS
HEART RATE: 89 BPM | SYSTOLIC BLOOD PRESSURE: 107 MMHG | WEIGHT: 207 LBS | DIASTOLIC BLOOD PRESSURE: 61 MMHG | BODY MASS INDEX: 27.43 KG/M2 | OXYGEN SATURATION: 100 % | HEIGHT: 73 IN | TEMPERATURE: 96.9 F

## 2021-01-12 DIAGNOSIS — B35.1 ONYCHOMYCOSIS DUE TO DERMATOPHYTE: Primary | ICD-10-CM

## 2021-01-12 PROCEDURE — 99213 OFFICE O/P EST LOW 20 MIN: CPT | Performed by: INTERNAL MEDICINE

## 2021-01-12 ASSESSMENT — MIFFLIN-ST. JEOR: SCORE: 1877.83

## 2021-01-12 NOTE — PROGRESS NOTES
"  Assessment & Plan   Problem List Items Addressed This Visit     None      Visit Diagnoses     Onychomycosis due to dermatophyte    -  Primary    Relevant Orders    Orthopedic & Spine  Referral         We discussed use of oral antifungal mainly terbinafine versus topical antifungal and success is most likely with oral medications.  But he has to take oral medicine for at least 12 weeks.  Advised he will need to have baseline LFTs and white blood cell count and this to be monitored periodically during therapy. This provider offered to start patient on oral medication therapy for his condition but patient declined. He would like that to be under care of podiatry and referral has been placed.  All questions answered.         BMI:   Estimated body mass index is 27.31 kg/m  as calculated from the following:    Height as of this encounter: 1.854 m (6' 1\").    Weight as of this encounter: 93.9 kg (207 lb).         CONSULTATION/REFERRAL to podiatry  See Patient Instructions    No follow-ups on file.    Xiomara Bergeron MD  [facility] JOEY Zuñiga is a 45 year old who presents to clinic today for the following health issues   HPI    Chief Complaint   Patient presents with     Derm Problem     athletes foot both.  Took a pill a few years ago that helped but is cautious about taking meds          Patient presenting for evaluation of athletes foot and dystrophic toe nails mainly big toenails, he describes he was on oral antifungal years ago.  But he states is cautious with meds.  He would like referral to podiatry      Review of Systems   INTEGUMENTARY/SKIN: POSITIVE for nail changes      Objective    /61 (BP Location: Left arm, Patient Position: Chair, Cuff Size: Adult Regular)   Pulse 89   Temp 96.9  F (36.1  C) (Temporal)   Ht 1.854 m (6' 1\")   Wt 93.9 kg (207 lb)   SpO2 100%   BMI 27.31 kg/m    Body mass index is 27.31 kg/m .  Physical Exam   GENERAL: healthy, alert and " no distress  SKIN: no suspicious lesions or rashes and left big toe nail dystrophic changes. No surrounding erythema or swelling.     Office Visit on 04/18/2019   Component Date Value Ref Range Status     Sodium 04/18/2019 138  133 - 144 mmol/L Final     Potassium 04/18/2019 4.4  3.4 - 5.3 mmol/L Final     Chloride 04/18/2019 106  94 - 109 mmol/L Final     Carbon Dioxide 04/18/2019 26  20 - 32 mmol/L Final     Anion Gap 04/18/2019 6  3 - 14 mmol/L Final     Glucose 04/18/2019 99  70 - 99 mg/dL Final     Urea Nitrogen 04/18/2019 21  7 - 30 mg/dL Final     Creatinine 04/18/2019 0.94  0.66 - 1.25 mg/dL Final     GFR Estimate 04/18/2019 >90  >60 mL/min/[1.73_m2] Final    Comment: Non  GFR Calc  Starting 12/18/2018, serum creatinine based estimated GFR (eGFR) will be   calculated using the Chronic Kidney Disease Epidemiology Collaboration   (CKD-EPI) equation.       GFR Estimate If Black 04/18/2019 >90  >60 mL/min/[1.73_m2] Final    Comment:  GFR Calc  Starting 12/18/2018, serum creatinine based estimated GFR (eGFR) will be   calculated using the Chronic Kidney Disease Epidemiology Collaboration   (CKD-EPI) equation.       Calcium 04/18/2019 9.3  8.5 - 10.1 mg/dL Final     Bilirubin Total 04/18/2019 0.5  0.2 - 1.3 mg/dL Final     Albumin 04/18/2019 4.4  3.4 - 5.0 g/dL Final     Protein Total 04/18/2019 7.9  6.8 - 8.8 g/dL Final     Alkaline Phosphatase 04/18/2019 51  40 - 150 U/L Final     ALT 04/18/2019 46  0 - 70 U/L Final     AST 04/18/2019 22  0 - 45 U/L Final

## 2021-01-19 ENCOUNTER — OFFICE VISIT (OUTPATIENT)
Dept: PODIATRY | Facility: CLINIC | Age: 46
End: 2021-01-19
Attending: INTERNAL MEDICINE
Payer: COMMERCIAL

## 2021-01-19 VITALS
DIASTOLIC BLOOD PRESSURE: 72 MMHG | BODY MASS INDEX: 27.17 KG/M2 | WEIGHT: 205 LBS | HEIGHT: 73 IN | SYSTOLIC BLOOD PRESSURE: 110 MMHG

## 2021-01-19 DIAGNOSIS — B35.3 TINEA PEDIS OF BOTH FEET: ICD-10-CM

## 2021-01-19 DIAGNOSIS — B35.1 ONYCHOMYCOSIS DUE TO DERMATOPHYTE: ICD-10-CM

## 2021-01-19 PROCEDURE — 99243 OFF/OP CNSLTJ NEW/EST LOW 30: CPT | Performed by: PODIATRIST

## 2021-01-19 ASSESSMENT — MIFFLIN-ST. JEOR: SCORE: 1868.75

## 2021-01-19 NOTE — PATIENT INSTRUCTIONS
Thank you for choosing [facility]!    DR. ALEJANDRA'S CLINIC LOCATIONS     [facility] [address] [address] [place] [address]   [phone]  [phone]  [phone]     SCHEDULE SURGERY: [phone]  APPOINTMENTS: [phone]  BILLING QUESTIONS: [phone]      ATHLETE'S FOOT (TINEA PEDIS)  It is a rash that is caused by a fungus (most commonly Dermatophytes) in the outer layer of skin on the foot or in the nails. It can occur between the toes or on the instep and heel areas of the feet. Fungus will grow in warm and moist environments. The fungus that causes athlete's foot is contagious and you can get it from touching someone who has it of walking around bare foot around swimming pools, gyms, saunas, communal baths and showers, fitness centers or locker rooms.     SYMPTOMS  The symptoms of athlete's foot are numerous and include; itching, burning or stinging between the toes or on the soles of the feet, blisters, cracked and peeling skin, excessive dryness or excessive scaling on the bottom or sides of the feet, redness and softness with breaking down of the skin, especially between the toes, foot odor and thick, crumbly nails. Older males or people who live in warm humid environments are more prone to get it but it can develop at any age.    TREATMENT OPTIONS  Typically it can be treated with antifungal creams, lotions, ointments, sprays, or gels.  Many are available over the counter, some are prescription. It is important that you use them long enough, typically 4 weeks, to clear the rash. If an infection is particularly bad, your provider may prescribe oral medication. It is important that you follow the directions for any medication carefully to prevent recurrence of the rash.    HOME TREATMENT  1.  Keep feet clean and dry  2.  Dry between your toes any time your feet become wet  3.  Wear socks  that are made of cotton, wool, or fibers designed to wick moisture away  from skin. Nylon, lycra, and spandex tend to trap moisture.  4.  If you have blistering, you may soak your feet in Burow's solution (aluminum acetate is active ingredient. Domeboro is a brand sold at [employer]). This is available over the counter.  5.  Treat shows with antifungal powder  6.  Tea Tree oil may help reduce symptoms but does not cure the rash.    PREVENTION  1.  Change socks or stockings regularly.  2.  Use talcum powder on your feet if they sweat a lot.  3.  Do not borrow shoes.  4.  Let shoes dry out for 24 hours before wearing them again.  5.  Treat shoes with fungal powder  6.  Wear shoes that are well ventilated such as leather shoes or sandals.  Avoid shoes  made of synthetic materials such as vinyl or rubber.  7.  Wear water proof sandals when you are in public areas such as locker rooms, pools, communal baths, showers, saunas, and fitness centers.    FYI: Call or seek medical attention IMMEDIATELY if:  - You develop a fever over 100.4F for more than 24 hrs.  - There is a discharge of pus from infected areas.  - Red streaks develop from the affected areas  - Increase in redness, warmth, pain, swelling, or tenderness in the affected areas.      NAIL FUNGUS / ONYCHOMYCOSIS   Nail fungus is not a hygiene problem and will likely not lead to significant medical problems. The nails may get thick causing pain and possibly local skin infection. Treatments include debridement (trimming), oral antifungals, topical antifungals and complete removal of the nail. Most fungal nails are not treated.     Topicals such as tea tree oil can be helpful for surface fungus and may, at best, limit progression. Over the counter creams (such as Lamisil) can also be used however, their effectiveness is also quite low.  Topical treatment with Pen lac is expensive and often not covered by insurance. Pen lac has an approximate 8% success rate. Topical  therapy recommendations is to apply twice a day for at least 3-4 months as it takes 9 months for new nail to grow out.     Experts suggest soaking your feet for 15 to 20 minutes in a mixture of 1 cup vinegar to 4 cups warm water. Be sure to rinse well and pat your feet dry when you're done. You can soak your feet like this daily. But if your skin becomes irritated, try soaking only two to three times a week. Vicks VapoRub, as with vinegar, there have been no controlled clinical trials to assess the effectiveness of Vicks VapoRub on nail fungus, but there have been numerous anecdotal reports that it works. There's no consensus on how often to apply this product, so check with your doctor before using it on your nails.      Oral therapies include Sporanox and Lamisil. Oral therapies are also expensive and not very effective. Side effects such as liver disease are the main concern. Return of fungus is common even if the treatment worked.      Other Tips:  - Penlac nail medication apply daily x 4 months; remove old polish first day of each week  - Antifungal cream/powder (Zeasorb) - apply daily to feet and shoes x 2 months  - Clean shoes with Lysol or in washing machine every few weeks  - Rotate shoe gear; give them 24 hours to dry out between days wearing them  - Clean pair of socks in morning, clean pair in afternoon if your feet sweat  - Shower shoes used in public showers/pools

## 2021-01-19 NOTE — PROGRESS NOTES
PATIENT HISTORY:  Yogesh Alanis is a 45 year old male who presents to clinic for bilateral toenail discoloration and thickening. This is affecting his toenails 1,3,5 on the left and the first toenail on the right. Patient also notes history of athlete's foot. He recently started treating this with some over-the-counter medication. Reports taking oral medication for nail fungus several years ago but he thinks he did not complete the course. He did note some improvement at that time.    I was requested to see this patient for this issue by Dr Bergeron.    Review of Systems:  Patient denies fever, chills, rash, wound, stiffness, limping, numbness, weakness, heart burn, blood in stool, chest pain with activity, calf pain when walking, shortness of breath with activity, chronic cough, easy bleeding/bruising, swelling of ankles, excessive thirst, fatigue, depression, anxiety.       PAST MEDICAL HISTORY:   Past Medical History:   Diagnosis Date     Anxiety      Double vision      Environmental allergies      Headache(784.0) 12/19/2014    negative work up/resolved     Hearing loss      Heartburn      Indigestion      Nasal congestion      Problems related to lack of adequate sleep      Sneezing      Sore throat      Tinnitus         PAST SURGICAL HISTORY:   Past Surgical History:   Procedure Laterality Date     C APPENDECTOMY       HC REPAIR ACHILLES TENDON,PRIMARY      left     SURGICAL HISTORY OF -       bilateral knee arthroscopies        MEDICATIONS:   Current Outpatient Medications:      Ascorbic Acid (VITAMIN C PO), Take 250 mg by mouth daily, Disp: , Rfl:      GARLIC PO, 3 capsules a day, Disp: , Rfl:      Multiple Vitamin (MULTIVITAMIN OR), Take  by mouth., Disp: , Rfl:      cromolyn (OPTICROM) 4 % ophthalmic solution, Place 1 drop into both eyes 4 times daily (Patient not taking: Reported on 10/15/2020), Disp: 1 Bottle, Rfl: 5     diclofenac (VOLTAREN) 1 % topical gel, Place 2 g onto the skin 4 times daily (Patient  not taking: Reported on 2021), Disp: 350 g, Rfl: 1     LANsoprazole (PREVACID) 30 MG DR capsule, Take 1 capsule (30 mg) by mouth every morning (before breakfast) (Patient not taking: Reported on 10/15/2020), Disp: 90 capsule, Rfl: 3     ALLERGIES:    Allergies   Allergen Reactions     Amoxicillin Other (See Comments)     High blood pressure   Hypertension and high blood pressure     Hydrocodone-Acetaminophen      Precipitates migraine headaches        SOCIAL HISTORY:   Social History     Socioeconomic History     Marital status:      Spouse name: Not on file     Number of children: Not on file     Years of education: Not on file     Highest education level: Not on file   Occupational History     Occupation:      Employer: [employer]   Social Needs     Financial resource strain: Not on file     Food insecurity     Worry: Not on file     Inability: Not on file     Transportation needs     Medical: Not on file     Non-medical: Not on file   Tobacco Use     Smoking status: Former Smoker     Quit date: 1999     Years since quittin.7     Smokeless tobacco: Never Used   Substance and Sexual Activity     Alcohol use: Yes     Comment: social     Drug use: No     Sexual activity: Yes     Partners: Female   Lifestyle     Physical activity     Days per week: Not on file     Minutes per session: Not on file     Stress: Not on file   Relationships     Social connections     Talks on phone: Not on file     Gets together: Not on file     Attends Mandaeism service: Not on file     Active member of club or organization: Not on file     Attends meetings of clubs or organizations: Not on file     Relationship status: Not on file     Intimate partner violence     Fear of current or ex partner: Not on file     Emotionally abused: Not on file     Physically abused: Not on file     Forced sexual activity: Not on file   Other Topics Concern     Parent/sibling w/ CABG, MI or angioplasty before 65F  "55M? No   Social History Narrative    Dairy/d 1 servings/d    Caffeine 0-1 servings/d    Exercise 4-5 x week    Sunscreen used - No    Seatbelts used - Yes    Working smoke/CO detectors in the home - Yes    Guns stored in the home - No    Self Breast Exams - NOT APPLICABLE    Self Testicular Exam - No    Eye Exam up to date - Yes    Dental Exam up to date - Yes    Pap Smear up to date - NOT APPLICABLE    Mammogram up to date - NOT APPLICABLE    PSA up to date - NOT APPLICABLE    Dexa Scan up to date - NOT APPLICABLE    Flex Sig / Colonoscopy up to date - NOT APPLICABLE    Immunizations up to date - Yes    Abuse: Current or Past(Physical, Sexual or Emotional)- No    Do you feel safe in your environment - Yes    10/08                FAMILY HISTORY:   Family History   Problem Relation Age of Onset     Breast Cancer Mother      C.A.D. Paternal Grandmother      Asthma Sister      Diabetes No family hx of      Hypertension No family hx of      Cerebrovascular Disease No family hx of      Cancer - colorectal No family hx of      Prostate Cancer No family hx of      Alcohol/Drug No family hx of         EXAM:Vitals: /72   Ht 1.854 m (6' 1\")   Wt 93 kg (205 lb)   BMI 27.05 kg/m    BMI= Body mass index is 27.05 kg/m .    General appearance: Patient is alert and fully cooperative with history & exam.  No sign of distress is noted during the visit.     Psychiatric: Affect is pleasant & appropriate.  Patient appears motivated to improve health.     Respiratory: Breathing is regular & unlabored while sitting.     HEENT: Hearing is intact to spoken word.  Speech is clear.  No gross evidence of visual impairment that would impact ambulation.     Dermatologic: mild scaling rash noted interdigitally and to plantar surfaces b/l.   Left foot toenails one, three, five with thickening discoloration and dystrophic changes. Right first toenail with similar appearance. No paronychia or evidence of bacterial soft tissue infection is " noted.     Vascular: DP & PT pulses are intact & regular bilaterally.  No significant edema or varicosities noted.  CFT and skin temperature are normal to both lower extremities.     Neurologic: Lower extremity sensation is intact to light touch.  No evidence of weakness or contracture in the lower extremities.  No evidence of neuropathy.     Musculoskeletal: Patient is ambulatory without assistive device or brace.  No gross ankle deformity noted.  No foot or ankle joint effusion is noted.     ASSESSMENT:   B/l onychomycosis  B/l tinea pedis     PLAN:  Reviewed patient's chart in epic.  Discussed condition and treatment options including pros and cons.    Discussed causes of nail fungus.  Discussed treatment options with patient and explained that there isn't one treatment that is 100% effective.  Debridement is an option.  Discussed oral lamisil which is the most effective at about 70% but can have liver effects.  Discussed over the counter antifungal creams.  Explained that these are less effective and need to be applied twice a day for about 6 months.  Also talked about prescription topicals, which have similar efficacy.  Also discussed that if there was damage to the nail and the nail is now dystrophic that none of the above is going to change the nail.  Discussed that if it becomes painful, we can remove the nail in clinic.      Discussed tinea pedis.  Discussed over-the-counter treatment options such as otc topical antifungals.      Patient would like to proceed with oral Lamisil treatment.  This will hopefully help address the nail fungus and tinea pedis.  We will check liver function labs first.  Patient did not have time to get labs today so future orders were placed.  If these appear normal we will proceed with pulse dosing of Lamisil for 3 months.  Advised against alcohol consumption while using this medication.  We discussed risk of liver damage.    Gonzalez Palma, BONNIE, FACFAS

## 2021-01-19 NOTE — LETTER
1/19/2021         RE: Yogesh Alanis  [address]  [place] [address]        Dear Colleague,    Thank you for referring your patient, Yogesh Alanis, to the [facility]. Please see a copy of my visit note below.    PATIENT HISTORY:  Yogesh Alanis is a 45 year old male who presents to clinic for bilateral toenail discoloration and thickening. This is affecting his toenails 1,3,5 on the left and the first toenail on the right. Patient also notes history of athlete's foot. He recently started treating this with some over-the-counter medication. Reports taking oral medication for nail fungus several years ago but he thinks he did not complete the course. He did note some improvement at that time.    I was requested to see this patient for this issue by Dr Bergeron.    Review of Systems:  Patient denies fever, chills, rash, wound, stiffness, limping, numbness, weakness, heart burn, blood in stool, chest pain with activity, calf pain when walking, shortness of breath with activity, chronic cough, easy bleeding/bruising, swelling of ankles, excessive thirst, fatigue, depression, anxiety.       PAST MEDICAL HISTORY:   Past Medical History:   Diagnosis Date     Anxiety      Double vision      Environmental allergies      Headache(784.0) 12/19/2014    negative work up/resolved     Hearing loss      Heartburn      Indigestion      Nasal congestion      Problems related to lack of adequate sleep      Sneezing      Sore throat      Tinnitus         PAST SURGICAL HISTORY:   Past Surgical History:   Procedure Laterality Date     C APPENDECTOMY       HC REPAIR ACHILLES TENDON,PRIMARY      left     SURGICAL HISTORY OF -       bilateral knee arthroscopies        MEDICATIONS:   Current Outpatient Medications:      Ascorbic Acid (VITAMIN C PO), Take 250 mg by mouth daily, Disp: , Rfl:      GARLIC PO, 3 capsules a day, Disp: , Rfl:      Multiple Vitamin (MULTIVITAMIN OR), Take  by mouth.,  Disp: , Rfl:      cromolyn (OPTICROM) 4 % ophthalmic solution, Place 1 drop into both eyes 4 times daily (Patient not taking: Reported on 10/15/2020), Disp: 1 Bottle, Rfl: 5     diclofenac (VOLTAREN) 1 % topical gel, Place 2 g onto the skin 4 times daily (Patient not taking: Reported on 2021), Disp: 350 g, Rfl: 1     LANsoprazole (PREVACID) 30 MG DR capsule, Take 1 capsule (30 mg) by mouth every morning (before breakfast) (Patient not taking: Reported on 10/15/2020), Disp: 90 capsule, Rfl: 3     ALLERGIES:    Allergies   Allergen Reactions     Amoxicillin Other (See Comments)     High blood pressure   Hypertension and high blood pressure     Hydrocodone-Acetaminophen      Precipitates migraine headaches        SOCIAL HISTORY:   Social History     Socioeconomic History     Marital status:      Spouse name: Not on file     Number of children: Not on file     Years of education: Not on file     Highest education level: Not on file   Occupational History     Occupation:      Employer: [employer]   Social Needs     Financial resource strain: Not on file     Food insecurity     Worry: Not on file     Inability: Not on file     Transportation needs     Medical: Not on file     Non-medical: Not on file   Tobacco Use     Smoking status: Former Smoker     Quit date: 1999     Years since quittin.7     Smokeless tobacco: Never Used   Substance and Sexual Activity     Alcohol use: Yes     Comment: social     Drug use: No     Sexual activity: Yes     Partners: Female   Lifestyle     Physical activity     Days per week: Not on file     Minutes per session: Not on file     Stress: Not on file   Relationships     Social connections     Talks on phone: Not on file     Gets together: Not on file     Attends Zoroastrian service: Not on file     Active member of club or organization: Not on file     Attends meetings of clubs or organizations: Not on file     Relationship status: Not on file      "Intimate partner violence     Fear of current or ex partner: Not on file     Emotionally abused: Not on file     Physically abused: Not on file     Forced sexual activity: Not on file   Other Topics Concern     Parent/sibling w/ CABG, MI or angioplasty before 65F 55M? No   Social History Narrative    Dairy/d 1 servings/d    Caffeine 0-1 servings/d    Exercise 4-5 x week    Sunscreen used - No    Seatbelts used - Yes    Working smoke/CO detectors in the home - Yes    Guns stored in the home - No    Self Breast Exams - NOT APPLICABLE    Self Testicular Exam - No    Eye Exam up to date - Yes    Dental Exam up to date - Yes    Pap Smear up to date - NOT APPLICABLE    Mammogram up to date - NOT APPLICABLE    PSA up to date - NOT APPLICABLE    Dexa Scan up to date - NOT APPLICABLE    Flex Sig / Colonoscopy up to date - NOT APPLICABLE    Immunizations up to date - Yes    Abuse: Current or Past(Physical, Sexual or Emotional)- No    Do you feel safe in your environment - Yes    10/08                FAMILY HISTORY:   Family History   Problem Relation Age of Onset     Breast Cancer Mother      C.A.D. Paternal Grandmother      Asthma Sister      Diabetes No family hx of      Hypertension No family hx of      Cerebrovascular Disease No family hx of      Cancer - colorectal No family hx of      Prostate Cancer No family hx of      Alcohol/Drug No family hx of         EXAM:Vitals: /72   Ht 1.854 m (6' 1\")   Wt 93 kg (205 lb)   BMI 27.05 kg/m    BMI= Body mass index is 27.05 kg/m .    General appearance: Patient is alert and fully cooperative with history & exam.  No sign of distress is noted during the visit.     Psychiatric: Affect is pleasant & appropriate.  Patient appears motivated to improve health.     Respiratory: Breathing is regular & unlabored while sitting.     HEENT: Hearing is intact to spoken word.  Speech is clear.  No gross evidence of visual impairment that would impact ambulation.     Dermatologic: mild " scaling rash noted interdigitally and to plantar surfaces b/l.   Left foot toenails one, three, five with thickening discoloration and dystrophic changes. Right first toenail with similar appearance. No paronychia or evidence of bacterial soft tissue infection is noted.     Vascular: DP & PT pulses are intact & regular bilaterally.  No significant edema or varicosities noted.  CFT and skin temperature are normal to both lower extremities.     Neurologic: Lower extremity sensation is intact to light touch.  No evidence of weakness or contracture in the lower extremities.  No evidence of neuropathy.     Musculoskeletal: Patient is ambulatory without assistive device or brace.  No gross ankle deformity noted.  No foot or ankle joint effusion is noted.     ASSESSMENT:   B/l onychomycosis  B/l tinea pedis     PLAN:  Reviewed patient's chart in epic.  Discussed condition and treatment options including pros and cons.    Discussed causes of nail fungus.  Discussed treatment options with patient and explained that there isn't one treatment that is 100% effective.  Debridement is an option.  Discussed oral lamisil which is the most effective at about 70% but can have liver effects.  Discussed over the counter antifungal creams.  Explained that these are less effective and need to be applied twice a day for about 6 months.  Also talked about prescription topicals, which have similar efficacy.  Also discussed that if there was damage to the nail and the nail is now dystrophic that none of the above is going to change the nail.  Discussed that if it becomes painful, we can remove the nail in clinic.      Discussed tinea pedis.  Discussed over-the-counter treatment options such as otc topical antifungals.      Patient would like to proceed with oral Lamisil treatment.  This will hopefully help address the nail fungus and tinea pedis.  We will check liver function labs first.  Patient did not have time to get labs today so future  orders were placed.  If these appear normal we will proceed with pulse dosing of Lamisil for 3 months.  Advised against alcohol consumption while using this medication.  We discussed risk of liver damage.    Gonzalez Palma DPM, FACFAS            Again, thank you for allowing me to participate in the care of your patient.        Sincerely,        Gonzalez Palma DPM

## 2021-01-20 ENCOUNTER — APPOINTMENT (OUTPATIENT)
Dept: FAMILY MEDICINE | Facility: CLINIC | Age: 46
End: 2021-01-20
Payer: COMMERCIAL

## 2021-06-01 ENCOUNTER — TRANSFERRED RECORDS (OUTPATIENT)
Dept: FAMILY MEDICINE | Facility: CLINIC | Age: 46
End: 2021-06-01

## 2021-06-01 ENCOUNTER — OFFICE VISIT (OUTPATIENT)
Dept: FAMILY MEDICINE | Facility: CLINIC | Age: 46
End: 2021-06-01
Payer: COMMERCIAL

## 2021-06-01 VITALS
HEART RATE: 66 BPM | WEIGHT: 212 LBS | TEMPERATURE: 97.1 F | OXYGEN SATURATION: 98 % | SYSTOLIC BLOOD PRESSURE: 102 MMHG | BODY MASS INDEX: 27.97 KG/M2 | DIASTOLIC BLOOD PRESSURE: 86 MMHG | RESPIRATION RATE: 16 BRPM

## 2021-06-01 DIAGNOSIS — Z12.11 SCREEN FOR COLON CANCER: ICD-10-CM

## 2021-06-01 DIAGNOSIS — M70.21 OLECRANON BURSITIS OF RIGHT ELBOW: ICD-10-CM

## 2021-06-01 DIAGNOSIS — Z01.818 PREOP GENERAL PHYSICAL EXAM: Primary | ICD-10-CM

## 2021-06-01 DIAGNOSIS — Z30.9 ENCOUNTER FOR CONTRACEPTIVE MANAGEMENT, UNSPECIFIED TYPE: ICD-10-CM

## 2021-06-01 PROCEDURE — 99214 OFFICE O/P EST MOD 30 MIN: CPT | Performed by: FAMILY MEDICINE

## 2021-06-01 NOTE — PROGRESS NOTES
[facility] [address] [place] [address]  Phone: [phone]  Primary Provider: Tatiana Samano  Pre-op Performing Provider: MARGUERITE TRJEO       PREOPERATIVE EVALUATION:  Today's date: 6/1/2021    Yogesh Alanis is a 45 year old male who presents for a preoperative evaluation.    Surgical Information:   Surgery/Procedure: RIGHT olecranon bursectomy and RIGHT olecranaon traction osteophyte excison  Surgery Location: [facility]   Surgeon: Keith Haddad MD  Surgery Date: 6/4/21  Time of Surgery: TBD  Where patient plans to recover: At home with family  Fax number for surgical facility: [phone]    Type of Anesthesia Anticipated: General    Assessment & Plan     The proposed surgical procedure is considered INTERMEDIATE risk.    Preop general physical exam       Olecranon bursitis of right elbow  And possible OA as per patient. rx as per ortho surgeon.    Screen for colon cancer     - GASTROENTEROLOGY ADULT REF PROCEDURE ONLY; Future    Encounter for contraceptive management, unspecified type     - Adult Urology Referral; Future         Risks and Recommendations:  The patient has the following additional risks and recommendations for perioperative complications:   - No identified additional risk factors other than previously addressed    Medication Instructions:  Patient is on no chronic medications  avoid nsaids. He understood.    RECOMMENDATION:  APPROVAL GIVEN to proceed with proposed procedure, without further diagnostic evaluation.                      Subjective     HPI related to upcoming procedure: bone chip on right elbow.   Painful.   Day surgery.   Police offer - affects work.     Preop Questions 6/1/2021   1. Have you ever had a heart attack or stroke? No   2. Have you ever had surgery on your heart or blood vessels, such as a stent placement, a coronary artery bypass, or surgery on an artery in your head, neck, heart,  or legs? No   3. Do you have chest pain with activity? No   4. Do you have a history of  heart failure? No   5. Do you currently have a cold, bronchitis or symptoms of other infection? No   6. Do you have a cough, shortness of breath, or wheezing? No   7. Do you or anyone in your family have previous history of blood clots? No   8. Do you or does anyone in your family have a serious bleeding problem such as prolonged bleeding following surgeries or cuts? No   9. Have you ever had problems with anemia or been told to take iron pills? No   10. Have you had any abnormal blood loss such as black, tarry or bloody stools? No   11. Have you ever had a blood transfusion? No   12. Are you willing to have a blood transfusion if it is medically needed before, during, or after your surgery? Yes   13. Have you or any of your relatives ever had problems with anesthesia? No   14. Do you have sleep apnea, excessive snoring or daytime drowsiness? No   15. Do you have any artifical heart valves or other implanted medical devices like a pacemaker, defibrillator, or continuous glucose monitor? No   16. Do you have artificial joints? No   17. Are you allergic to latex? No        Health Care Directive:  Patient does not have a Health Care Directive or Living Will: Advance Directive received and scanned. Click on Code in the patient header to view.    Preoperative Review of :   reviewed - no record of controlled substances prescribed.           Review of Systems  Constitutional, neuro, ENT, endocrine, pulmonary, cardiac, gastrointestinal, genitourinary, musculoskeletal, integument and psychiatric systems are negative, except as otherwise noted.    Patient Active Problem List    Diagnosis Date Noted     Gastroesophageal reflux disease without esophagitis 09/27/2017     Priority: Medium     CARDIOVASCULAR SCREENING; LDL GOAL LESS THAN 160 10/31/2010     Priority: Medium     Environmental allergies      Priority: Medium      Past  Medical History:   Diagnosis Date     Anxiety      Double vision      Environmental allergies      Headache(784.0) 2014    negative work up/resolved     Hearing loss      Heartburn      Indigestion      Nasal congestion      Problems related to lack of adequate sleep      Sneezing      Sore throat      Tinnitus      Past Surgical History:   Procedure Laterality Date     C APPENDECTOMY       HC REPAIR ACHILLES TENDON,PRIMARY      left     SURGICAL HISTORY OF -       bilateral knee arthroscopies     No current outpatient medications on file.       Allergies   Allergen Reactions     Amoxicillin Other (See Comments)     High blood pressure   Hypertension and high blood pressure     Hydrocodone-Acetaminophen      Precipitates migraine headaches        Social History     Tobacco Use     Smoking status: Former Smoker     Quit date: 1999     Years since quittin.1     Smokeless tobacco: Never Used   Substance Use Topics     Alcohol use: Yes     Comment: social     Family History   Problem Relation Age of Onset     Breast Cancer Mother      C.A.D. Paternal Grandmother      Asthma Sister      Diabetes No family hx of      Hypertension No family hx of      Cerebrovascular Disease No family hx of      Cancer - colorectal No family hx of      Prostate Cancer No family hx of      Alcohol/Drug No family hx of      History   Drug Use No         Objective     /86 (BP Location: Right arm, Patient Position: Sitting, Cuff Size: Adult Regular)   Pulse 66   Temp 97.1  F (36.2  C) (Tympanic)   Resp 16   Wt 96.2 kg (212 lb)   SpO2 98%   BMI 27.97 kg/m      Physical Exam    GENERAL APPEARANCE: healthy, alert and no distress     EYES: EOMI,  PERRL     NECK: no adenopathy, no asymmetry, masses, or scars and thyroid normal to palpation     RESP: lungs clear to auscultation - no rales, rhonchi or wheezes     CV: regular rates and rhythm, normal S1 S2, no S3 or S4 and no murmur, click or rub     MS: right elbow mild  swelling present.      SKIN: no suspicious lesions or rashes on visible parts      NEURO: Normal strength and tone, sensory exam grossly normal, mentation intact and speech normal     PSYCH: mentation appears normal. and affect normal/bright     LYMPHATICS: No cervical adenopathy     No results for input(s): HGB, PLT, INR, NA, POTASSIUM, CR, A1C in the last 47645 hours.     Diagnostics:  No labs were ordered during this visit.   No EKG required for low risk surgery (cataract, skin procedure, breast biopsy, etc).    Revised Cardiac Risk Index (RCRI):  The patient has the following serious cardiovascular risks for perioperative complications:   - No serious cardiac risks = 0 points     RCRI Interpretation: 0 points: Class I (very low risk - 0.4% complication rate)    Signed Electronically by: Ryan Pace MD, MD  Copy of this evaluation report is provided to requesting physician.

## 2021-06-01 NOTE — PATIENT INSTRUCTIONS

## 2021-06-02 ENCOUNTER — TELEPHONE (OUTPATIENT)
Dept: GASTROENTEROLOGY | Facility: CLINIC | Age: 46
End: 2021-06-02

## 2021-06-02 DIAGNOSIS — Z11.59 ENCOUNTER FOR SCREENING FOR OTHER VIRAL DISEASES: ICD-10-CM

## 2021-06-02 NOTE — TELEPHONE ENCOUNTER
Screening Questions  1. What insurance is in the chart? [employer]    2.  Ordering/Referring Provider: Ryan Pace MD    3. BMI 27.0    4. Are you on daily home oxygen? no    5. Do you have a history of difficult airway? no    6. Have you had a heart, lung, or liver transplant? no    7. Are you currently on dialysis? no    8. Have you had a stroke or Transient ischemic atttack (TIA) within 6 months? no    9. In the past 6 months, have you had any heart related issues including cardiomyopathy or heart attack?         If yes, did it require cardiac stenting or other implantable device?no    10. Do you have any implantable devices in your body (pacemaker, defib, LVAD)? no    11. Do you take nitroglycerin? If yes, how often? no    12. Are you currently taking any blood thinners?no    13. Are you a diabetic? no    14. (Females) Are you currently pregnant? n/a  If yes, how many weeks?    15. Have you had a procedure in the past that was difficult to tolerate with conscious sedation? Any allergies to Fentanyl or Versed no    16. Are you taking any scheduled prescription narcotics more than once daily? no    17. Do you have any chemical dependencies such as alcohol, street drugs, or methadone? no    18. Do you have any history of post-traumatic stress syndrome or mental health issues? no    19. Do you transfer independently? yes    20.  Do you have any issues with constipation? no    21. Preferred Pharmacy for Pre Prescription     Scheduling Details    Procedure Scheduled: Colonoscopy  Provider/Surgeon: Victor M  Date of Procedure: 6/14/21  Location: [facility]  Caller (Please ask for phone number if not scheduled by patient): Bharath      Sedation Type: CS  Conscious Sedation- Needs  for 6 hours after the procedure  MAC/General-Needs  for 24 hours after procedure    Pre-op Required at [facility] and OR for  MAC sedation:   (if yes advise patient they will need a pre-op prior to procedure)      Is patient on blood  thinners? -NO (If yes- inform patient to follow up with PCP or provider for follow up instructions)     Informed patient they will need an adult  YES  Cannot take any type of public or medical transportation alone    Informed Patient of COVID Test Requirement YES    Confirmed Nurse will call to complete assessment YES    Additional comments: Patient will set up COVID test

## 2021-06-03 ENCOUNTER — TELEPHONE (OUTPATIENT)
Dept: GASTROENTEROLOGY | Facility: OUTPATIENT CENTER | Age: 46
End: 2021-06-03

## 2021-06-24 ENCOUNTER — VIRTUAL VISIT (OUTPATIENT)
Dept: UROLOGY | Facility: CLINIC | Age: 46
End: 2021-06-24
Attending: FAMILY MEDICINE
Payer: COMMERCIAL

## 2021-06-24 VITALS — BODY MASS INDEX: 26.82 KG/M2 | HEIGHT: 74 IN | WEIGHT: 209 LBS

## 2021-06-24 DIAGNOSIS — Z30.9 ENCOUNTER FOR CONTRACEPTIVE MANAGEMENT, UNSPECIFIED TYPE: ICD-10-CM

## 2021-06-24 PROCEDURE — 99203 OFFICE O/P NEW LOW 30 MIN: CPT | Mod: 95 | Performed by: UROLOGY

## 2021-06-24 ASSESSMENT — MIFFLIN-ST. JEOR: SCORE: 1902.77

## 2021-06-24 ASSESSMENT — PAIN SCALES - GENERAL: PAINLEVEL: NO PAIN (0)

## 2021-06-24 NOTE — LETTER
6/24/2021       RE: Yogesh Alanis  [address]  [place] [address]     Dear Colleague,    Thank you for referring your patient, Yogesh Alanis, to the [facility] at [facility]. Please see a copy of my visit note below.    8PT WILL MEET YOU IN MYCHART*    Bharath is a 45 year old who is being evaluated via a billable video visit.      How would you like to obtain your AVS? MyChart  If the video visit is dropped, the invitation should be resent by: Text to cell phone: [phone]  Will anyone else be joining your video visit? No     VASECTOMY CONSULTATION NOTE  [facility]  [phone]  DATE OF VISIT: 6/24/2021    PATIENT NAME: Yogesh Alanis    YOB: 1975      REASON FOR CONSULTATION: Mr. Yogesh Alanis is a 45 year old year old gentleman who is being seen as a virtual visit in the urology clinic today requesting a vasectomy. He has 3 children as well as a grandchild and he wishes to have a vasectomy for birth control. He has no prior urologic history and has had no prior surgery on the testicles. He has no symptoms in the testicles.    PAST MEDICAL HISTORY:   Past Medical History:   Diagnosis Date     Anxiety      Double vision      Environmental allergies      Headache(784.0) 12/19/2014    negative work up/resolved     Hearing loss      Heartburn      Indigestion      Nasal congestion      Problems related to lack of adequate sleep      Sneezing      Sore throat      Tinnitus        PAST SURGICAL HISTORY:   Past Surgical History:   Procedure Laterality Date     C APPENDECTOMY       HC REPAIR ACHILLES TENDON,PRIMARY      left     SURGICAL HISTORY OF -       bilateral knee arthroscopies       MEDICATIONS: No current outpatient medications on file.    ALLERGIES:   Allergies   Allergen Reactions     Amoxicillin Other (See Comments)     High blood pressure   Hypertension and high blood pressure      Hydrocodone-Acetaminophen      Precipitates migraine headaches       FAMILY HISTORY:   Family History   Problem Relation Age of Onset     Breast Cancer Mother      C.A.D. Paternal Grandmother      Asthma Sister      Diabetes No family hx of      Hypertension No family hx of      Cerebrovascular Disease No family hx of      Cancer - colorectal No family hx of      Prostate Cancer No family hx of      Alcohol/Drug No family hx of        SOCIAL HISTORY:   Social History     Socioeconomic History     Marital status:      Spouse name: Not on file     Number of children: Not on file     Years of education: Not on file     Highest education level: Not on file   Occupational History     Occupation:      Employer: [employer]   Social Needs     Financial resource strain: Not on file     Food insecurity     Worry: Not on file     Inability: Not on file     Transportation needs     Medical: Not on file     Non-medical: Not on file   Tobacco Use     Smoking status: Former Smoker     Quit date: 1999     Years since quittin.1     Smokeless tobacco: Never Used   Substance and Sexual Activity     Alcohol use: Yes     Comment: social     Drug use: No     Sexual activity: Yes     Partners: Female   Lifestyle     Physical activity     Days per week: Not on file     Minutes per session: Not on file     Stress: Not on file   Relationships     Social connections     Talks on phone: Not on file     Gets together: Not on file     Attends Congregation service: Not on file     Active member of club or organization: Not on file     Attends meetings of clubs or organizations: Not on file     Relationship status: Not on file     Intimate partner violence     Fear of current or ex partner: Not on file     Emotionally abused: Not on file     Physically abused: Not on file     Forced sexual activity: Not on file   Other Topics Concern     Parent/sibling w/ CABG, MI or angioplasty before 65F 55M? No   Social  "History Narrative    Dairy/d 1 servings/d    Caffeine 0-1 servings/d    Exercise 4-5 x week    Sunscreen used - No    Seatbelts used - Yes    Working smoke/CO detectors in the home - Yes    Guns stored in the home - No    Self Breast Exams - NOT APPLICABLE    Self Testicular Exam - No    Eye Exam up to date - Yes    Dental Exam up to date - Yes    Pap Smear up to date - NOT APPLICABLE    Mammogram up to date - NOT APPLICABLE    PSA up to date - NOT APPLICABLE    Dexa Scan up to date - NOT APPLICABLE    Flex Sig / Colonoscopy up to date - NOT APPLICABLE    Immunizations up to date - Yes    Abuse: Current or Past(Physical, Sexual or Emotional)- No    Do you feel safe in your environment - Yes    10/08               REVIEW OF SYSTEMS:  Skin: No rash, pruritis, or skin pigmentation  Eyes: No changes in vision  Ears/Nose/Throat: No changes in hearing, no nosebleeds  Respiratory: No shortness of breath, dyspnea on exertion, cough, or hemoptysis  Cardiovascular: No chest pain or palpitations  Gastrointestinal: No diarrhea or constipation. No abdominal pain. No hematochezia  Genitourinary: see HPI  Musculoskeletal: No pain or swelling of joints, normal range of motion  Neurologic: No weakness or tremors  Psychiatric: No recent changes in memory or mood  Hematologic/Lymphatic/Immunologic: No easy bruising or enlarged lymph nodes  Endocrine: No weight gain or loss      HEIGHT: 6' 2\"     WEIGHT: 209 lbs 0 oz   BP: Data Unavailable    PULSE: Data Unavailable    EXAM:   General: Alert and oriented to time, place, and self. In NAD   HEENT: Head AT/NC, EOMI, CN Grossly intact   Lungs: no respiratory distress, or pursed lip breathing   Heart: No obvious jugular venous distension present   Musculoskeltal: Normal movements. Normal appearing musculature  Skin: no suspicious lesions or rashes   Neuro: Alert, oriented, speech and mentation normal; moving all 4 extremities equally.   Psych: affect and mood normal      DIAGNOSIS: Request " for sterilization    PLAN: The risks of the procedure as well as expectations for recovery and outcomes were splint in detail to him.  He was counseled on the risks for bleeding infection and pain after the procedure.  He was instructed to continue to use contraception until he had proven azoospermia on a semen specimen.  This would normally be collected at least 3 months after the procedure.  He was instructed to hold all anticoagulants medications for one week prior to the procedure.  He was also instructed to shave the scrotum prior to procedure.  It was recommended that he have someone else drive him home after his vasectomy.  In light of these risks and expectations he would like to proceed.  We are scheduling a vasectomy in the office in the near future.  This visit today was performed via video.  He understands that because of this I was not able to examine the testicles in person today.  I will perform an examination at the beginning of the vasectomy and he understands that there is a small chance the procedure may need to be moved to the operating room.    Marcellus Samayoa M.D.      Video Start Time: 8:30 AM     Video-Visit Details    Type of service:  Video Visit    Video End Time:8:38 AM    Originating Location (pt. Location): Home    Distant Location (provider location):  [facility] [place]     Platform used for Video Visit: [employer]

## 2021-06-24 NOTE — PROGRESS NOTES
[facility] WILL MEET YOU IN [employer]!HART*    Bharath is a 45 year old who is being evaluated via a billable video visit.      How would you like to obtain your AVS? [email or web address]  If the video visit is dropped, the invitation should be resent by: Text to cell phone: [phone]  Will anyone else be joining your video visit? No     VASECTOMY CONSULTATION NOTE  [facility]  [phone]  DATE OF VISIT: 6/24/2021    PATIENT NAME: Yogesh Alanis    YOB: 1975      REASON FOR CONSULTATION: Mr. Yogesh Alanis is a 45 year old year old gentleman who is being seen as a virtual visit in the urology clinic today requesting a vasectomy. He has 3 children as well as a grandchild and he wishes to have a vasectomy for birth control. He has no prior urologic history and has had no prior surgery on the testicles. He has no symptoms in the testicles.    PAST MEDICAL HISTORY:   Past Medical History:   Diagnosis Date     Anxiety      Double vision      Environmental allergies      Headache(784.0) 12/19/2014    negative work up/resolved     Hearing loss      Heartburn      Indigestion      Nasal congestion      Problems related to lack of adequate sleep      Sneezing      Sore throat      Tinnitus        PAST SURGICAL HISTORY:   Past Surgical History:   Procedure Laterality Date     C APPENDECTOMY       HC REPAIR ACHILLES TENDON,PRIMARY      left     SURGICAL HISTORY OF -       bilateral knee arthroscopies       MEDICATIONS: No current outpatient medications on file.    ALLERGIES:   Allergies   Allergen Reactions     Amoxicillin Other (See Comments)     High blood pressure   Hypertension and high blood pressure     Hydrocodone-Acetaminophen      Precipitates migraine headaches       FAMILY HISTORY:   Family History   Problem Relation Age of Onset     Breast Cancer Mother      C.A.D. Paternal Grandmother      Asthma Sister      Diabetes No family hx of      Hypertension No family hx of      Cerebrovascular Disease No family hx  of      Cancer - colorectal No family hx of      Prostate Cancer No family hx of      Alcohol/Drug No family hx of        SOCIAL HISTORY:   Social History     Socioeconomic History     Marital status:      Spouse name: Not on file     Number of children: Not on file     Years of education: Not on file     Highest education level: Not on file   Occupational History     Occupation:      Employer: [employer]   Social Needs     Financial resource strain: Not on file     Food insecurity     Worry: Not on file     Inability: Not on file     Transportation needs     Medical: Not on file     Non-medical: Not on file   Tobacco Use     Smoking status: Former Smoker     Quit date: 1999     Years since quittin.1     Smokeless tobacco: Never Used   Substance and Sexual Activity     Alcohol use: Yes     Comment: social     Drug use: No     Sexual activity: Yes     Partners: Female   Lifestyle     Physical activity     Days per week: Not on file     Minutes per session: Not on file     Stress: Not on file   Relationships     Social connections     Talks on phone: Not on file     Gets together: Not on file     Attends Buddhist service: Not on file     Active member of club or organization: Not on file     Attends meetings of clubs or organizations: Not on file     Relationship status: Not on file     Intimate partner violence     Fear of current or ex partner: Not on file     Emotionally abused: Not on file     Physically abused: Not on file     Forced sexual activity: Not on file   Other Topics Concern     Parent/sibling w/ CABG, MI or angioplasty before 65F 55M? No   Social History Narrative    Dairy/d 1 servings/d    Caffeine 0-1 servings/d    Exercise 4-5 x week    Sunscreen used - No    Seatbelts used - Yes    Working smoke/CO detectors in the home - Yes    Guns stored in the home - No    Self Breast Exams - NOT APPLICABLE    Self Testicular Exam - No    Eye Exam up to date - Yes     "Dental Exam up to date - Yes    Pap Smear up to date - NOT APPLICABLE    Mammogram up to date - NOT APPLICABLE    PSA up to date - NOT APPLICABLE    Dexa Scan up to date - NOT APPLICABLE    Flex Sig / Colonoscopy up to date - NOT APPLICABLE    Immunizations up to date - Yes    Abuse: Current or Past(Physical, Sexual or Emotional)- No    Do you feel safe in your environment - Yes    10/08               REVIEW OF SYSTEMS:  Skin: No rash, pruritis, or skin pigmentation  Eyes: No changes in vision  Ears/Nose/Throat: No changes in hearing, no nosebleeds  Respiratory: No shortness of breath, dyspnea on exertion, cough, or hemoptysis  Cardiovascular: No chest pain or palpitations  Gastrointestinal: No diarrhea or constipation. No abdominal pain. No hematochezia  Genitourinary: see HPI  Musculoskeletal: No pain or swelling of joints, normal range of motion  Neurologic: No weakness or tremors  Psychiatric: No recent changes in memory or mood  Hematologic/Lymphatic/Immunologic: No easy bruising or enlarged lymph nodes  Endocrine: No weight gain or loss      HEIGHT: 6' 2\"     WEIGHT: 209 lbs 0 oz   BP: Data Unavailable    PULSE: Data Unavailable    EXAM:   General: Alert and oriented to time, place, and self. In NAD   HEENT: Head AT/NC, EOMI, CN Grossly intact   Lungs: no respiratory distress, or pursed lip breathing   Heart: No obvious jugular venous distension present   Musculoskeltal: Normal movements. Normal appearing musculature  Skin: no suspicious lesions or rashes   Neuro: Alert, oriented, speech and mentation normal; moving all 4 extremities equally.   Psych: affect and mood normal      DIAGNOSIS: Request for sterilization    PLAN: The risks of the procedure as well as expectations for recovery and outcomes were splint in detail to him.  He was counseled on the risks for bleeding infection and pain after the procedure.  He was instructed to continue to use contraception until he had proven azoospermia on a semen " specimen.  This would normally be collected at least 3 months after the procedure.  He was instructed to hold all anticoagulants medications for one week prior to the procedure.  He was also instructed to shave the scrotum prior to procedure.  It was recommended that he have someone else drive him home after his vasectomy.  In light of these risks and expectations he would like to proceed.  We are scheduling a vasectomy in the office in the near future.  This visit today was performed via video.  He understands that because of this I was not able to examine the testicles in person today.  I will perform an examination at the beginning of the vasectomy and he understands that there is a small chance the procedure may need to be moved to the operating room.    Marcellus Samayoa M.D.      Video Start Time: 8:30 AM     Video-Visit Details    Type of service:  Video Visit    Video End Time:8:38 AM    Originating Location (pt. Location): Home    Distant Location (provider location):  [facility] [place]     Platform used for Video Visit: [employer]

## 2021-09-25 ENCOUNTER — HEALTH MAINTENANCE LETTER (OUTPATIENT)
Age: 46
End: 2021-09-25

## 2021-11-04 ENCOUNTER — OFFICE VISIT (OUTPATIENT)
Dept: UROLOGY | Facility: CLINIC | Age: 46
End: 2021-11-04
Payer: COMMERCIAL

## 2021-11-04 VITALS
HEART RATE: 80 BPM | SYSTOLIC BLOOD PRESSURE: 124 MMHG | DIASTOLIC BLOOD PRESSURE: 88 MMHG | OXYGEN SATURATION: 98 % | WEIGHT: 203 LBS | BODY MASS INDEX: 26.9 KG/M2 | HEIGHT: 73 IN

## 2021-11-04 DIAGNOSIS — Z30.2 ENCOUNTER FOR STERILIZATION: Primary | ICD-10-CM

## 2021-11-04 PROCEDURE — 55250 REMOVAL OF SPERM DUCT(S): CPT | Performed by: UROLOGY

## 2021-11-04 PROCEDURE — 88302 TISSUE EXAM BY PATHOLOGIST: CPT | Performed by: PATHOLOGY

## 2021-11-04 ASSESSMENT — MIFFLIN-ST. JEOR: SCORE: 1854.68

## 2021-11-04 ASSESSMENT — PAIN SCALES - GENERAL: PAINLEVEL: NO PAIN (0)

## 2021-11-04 NOTE — LETTER
11/4/2021       RE: Yogesh Alanis  [address]  [place] [address]     Dear Colleague,    Thank you for referring your patient, Yogesh Alanis, to the [facility] at [facility]. Please see a copy of my visit note below.    OFFICE VASECTOMY OPERATIVE NOTE  [facility]  [phone]    DATE: 11/04/21  PATIENT: Yogesh Alanis    YOB: 1975    Yogesh Alanis is a 46 year old male.  He has 3 children and he wishes a vasectomy for birth control.  He has read the brochure and he has shaved himself.  I reviewed the vasectomy procedure with him explaining that it would be done with a local anesthetic given just in the location where the vasectomy would be done.  It would be done through scalpel-less incisions with the removal of segments of the vasa, cauterization of the ends, and burying the ends separate with sutures.      Pt. Understands:  1/1000-1/3000 risk of future pregnancy even with perfectly done vasectomy  -vasectomy is a permanent procedure    -he may cryopreserve sperm if he wishes   -1-5% risk of post-vasectomy pain syndrome   -1-5% risk of complication, primarily infection or bleeding  - he needs to have a semen sample that shows no sperm before getting approval for unprotected intercourse.      Complications such as bleeding, infection, and damage to other tissues in the area were discussed.  I recommended that an ice bag be placed on the scrotum off and on tonight to help reduce pain and swelling.      He was reminded that he was not sterile immediately after the vasectomy that it would take at least 20 ejaculations to empty the vas of any remaining sperm.  He was not to provide a semen sample until after the 20th ejaculation and not before 12 weeks after the vas. He was  to fulfill both of those requirements.   He understands it is his responsibility to find out the results of the vas before  proceeding with intercourse without birth control protection.  Other items discussed were activity afterwards, returning to work, voluntary physical activity,  resuming sexual activity, clothing to wear, bathing, and care of the vas site and expected changes in the site as healing progresses.  After signing the permit, bilateral vasectomy was done as described through scalpel-less incisions.       ANESTHESIA: Local    DETAILS OF PROCEDURE: The risks of the procedure were explained in detail to the patient and informed consent was obtained. The patient was placed supine on the procedure table and the penis and scrotum were prepped and draped in the standard sterile fashion. The right vas deferens was isolated and brought up to the median raphe of the scrotum. 1% lidocaine local anesthesia was used to infiltrate the skin and the spermatic cord. A sharp hemostat was used to make a skin puncture. Adventitial tissues were swept away from the vas. A 1 cm segment of the vas was excised and sent for pathology. The proximal and distal lumina of the vas were cauterized and then each segment was tied off in a knuckling-fashion with a 3-0 chromic suture. Hemostasis was ensured and the segments were released back into the scrotum. Next the left vas was brought up to the same incision and a vasectomy was performed in the similar fashion. At the end of the procedure a single 3-0 chromic suture was placed in the skin.     COMPLICATIONS: None    DISMISSAL INSTRUCTIONS:  - Ice pack to scrotum 15 to 20 minutes each hour awake for 36 to 40 hours.  - No strenuous activity or ejaculation for 14 days.  - No unprotected sexual activity until proven azoospermia on semen samples at 3 months.  - Referred to patient handout for normal postop expectations and indications to contact nurse or physician.      M.D.: Marcellus Samayoa M.D.     Prior to the start of the procedure and with procedural staff participation, I verbally confirmed the  patient s identity using two indicators, relevant allergies, that the procedure was appropriate and matched the consent or emergent situation, and that the correct equipment/implants were available. Immediately prior to starting the procedure I conducted the Time Out with the procedural staff and re-confirmed the patient s name, procedure, and site/side. (The Joint Commission universal protocol was followed.)  Yes    Sedation (Moderate or Deep): None    Pt has signed the consent form today confirming that a VASECTOMY is the correct procedure today. I verbally confirmed the patient s identity using two indicators, that the pt has started any medication as prescribed for this procedure, relevant allergies, that they have not used blood thinning products in the last 7 - 10 days, and that the correct equipment was available. Immediately prior to starting the procedure I conducted the Time Out with the MD and re-confirmed the patient s name and procedure. Pathology ordered and sent to lab. Post-procedure information, also specimen collection cup with instructions, given to pt at time of check out.    KIA Moeller CMA    The following medication was given:     MEDICATION:  Lidocaine 2% Soln  ROUTE: INFILTRATION  SITE: R AND L VAS DEFERNS  DOSE: 2%  LOT #: -[id number]  : Hospira  EXPIRATION DATE: 1JAN2022  NDC#: 5242-1762-36   Was there drug waste? Yes  Amount of drug waste (mL): 05.  Reason for waste:  Single use vial  Multi-dose vial: No    Misha Moeller CMA  November 4, 2021

## 2021-11-04 NOTE — PROGRESS NOTES
OFFICE VASECTOMY OPERATIVE NOTE  [facility]  ([phone]    DATE: 11/04/21  PATIENT: Yogesh Alanis    YOB: 1975    Yogesh Alanis is a 46 year old male.  He has 3 children and he wishes a vasectomy for birth control.  He has read the brochure and he has shaved himself.  I reviewed the vasectomy procedure with him explaining that it would be done with a local anesthetic given just in the location where the vasectomy would be done.  It would be done through scalpel-less incisions with the removal of segments of the vasa, cauterization of the ends, and burying the ends separate with sutures.      Pt. Understands:  1/1000-1/3000 risk of future pregnancy even with perfectly done vasectomy  -vasectomy is a permanent procedure    -he may cryopreserve sperm if he wishes   -1-5% risk of post-vasectomy pain syndrome   -1-5% risk of complication, primarily infection or bleeding  - he needs to have a semen sample that shows no sperm before getting approval for unprotected intercourse.      Complications such as bleeding, infection, and damage to other tissues in the area were discussed.  I recommended that an ice bag be placed on the scrotum off and on tonight to help reduce pain and swelling.      He was reminded that he was not sterile immediately after the vasectomy that it would take at least 20 ejaculations to empty the vas of any remaining sperm.  He was not to provide a semen sample until after the 20th ejaculation and not before 12 weeks after the vas. He was  to fulfill both of those requirements.   He understands it is his responsibility to find out the results of the vas before proceeding with intercourse without birth control protection.  Other items discussed were activity afterwards, returning to work, voluntary physical activity,  resuming sexual activity, clothing to wear, bathing, and care of the vas site and expected changes in the site as healing progresses.  After signing the  permit, bilateral vasectomy was done as described through scalpel-less incisions.       ANESTHESIA: Local    DETAILS OF PROCEDURE: The risks of the procedure were explained in detail to the patient and informed consent was obtained. The patient was placed supine on the procedure table and the penis and scrotum were prepped and draped in the standard sterile fashion. The right vas deferens was isolated and brought up to the median raphe of the scrotum. 1% lidocaine local anesthesia was used to infiltrate the skin and the spermatic cord. A sharp hemostat was used to make a skin puncture. Adventitial tissues were swept away from the vas. A 1 cm segment of the vas was excised and sent for pathology. The proximal and distal lumina of the vas were cauterized and then each segment was tied off in a knuckling-fashion with a 3-0 chromic suture. Hemostasis was ensured and the segments were released back into the scrotum. Next the left vas was brought up to the same incision and a vasectomy was performed in the similar fashion. At the end of the procedure a single 3-0 chromic suture was placed in the skin.     COMPLICATIONS: None    DISMISSAL INSTRUCTIONS:  - Ice pack to scrotum 15 to 20 minutes each hour awake for 36 to 40 hours.  - No strenuous activity or ejaculation for 14 days.  - No unprotected sexual activity until proven azoospermia on semen samples at 3 months.  - Referred to patient handout for normal postop expectations and indications to contact nurse or physician.      M.D.: Marcellus Samayoa M.D.     Prior to the start of the procedure and with procedural staff participation, I verbally confirmed the patient s identity using two indicators, relevant allergies, that the procedure was appropriate and matched the consent or emergent situation, and that the correct equipment/implants were available. Immediately prior to starting the procedure I conducted the Time Out with the procedural staff and re-confirmed the  patient s name, procedure, and site/side. (The Joint Commission universal protocol was followed.)  Yes    Sedation (Moderate or Deep): None    Pt has signed the consent form today confirming that a VASECTOMY is the correct procedure today. I verbally confirmed the patient s identity using two indicators, that the pt has started any medication as prescribed for this procedure, relevant allergies, that they have not used blood thinning products in the last 7 - 10 days, and that the correct equipment was available. Immediately prior to starting the procedure I conducted the Time Out with the MD and re-confirmed the patient s name and procedure. Pathology ordered and sent to lab. Post-procedure information, also specimen collection cup with instructions, given to pt at time of check out.    KIA Moeller CMA    The following medication was given:     MEDICATION:  Lidocaine 2% Soln  ROUTE: INFILTRATION  SITE: R AND L VAS DEFERNS  DOSE: 2%  LOT #: -[id number]  : Hospira  EXPIRATION DATE: 1JAN2022  NDC#: 9583-7733-54   Was there drug waste? Yes  Amount of drug waste (mL): 05.  Reason for waste:  Single use vial  Multi-dose vial: No    Misha Moeller CMA  November 4, 2021

## 2021-11-05 LAB
PATH REPORT.COMMENTS IMP SPEC: NORMAL
PATH REPORT.COMMENTS IMP SPEC: NORMAL
PATH REPORT.FINAL DX SPEC: NORMAL
PATH REPORT.GROSS SPEC: NORMAL
PATH REPORT.MICROSCOPIC SPEC OTHER STN: NORMAL
PATH REPORT.RELEVANT HX SPEC: NORMAL
PHOTO IMAGE: NORMAL

## 2022-01-15 ENCOUNTER — HEALTH MAINTENANCE LETTER (OUTPATIENT)
Age: 47
End: 2022-01-15

## 2022-04-04 ENCOUNTER — OFFICE VISIT (OUTPATIENT)
Dept: FAMILY MEDICINE | Facility: CLINIC | Age: 47
End: 2022-04-04
Payer: OTHER MISCELLANEOUS

## 2022-04-04 VITALS
DIASTOLIC BLOOD PRESSURE: 81 MMHG | HEART RATE: 78 BPM | BODY MASS INDEX: 28.49 KG/M2 | SYSTOLIC BLOOD PRESSURE: 130 MMHG | OXYGEN SATURATION: 97 % | WEIGHT: 215 LBS | RESPIRATION RATE: 17 BRPM | TEMPERATURE: 96.8 F | HEIGHT: 73 IN

## 2022-04-04 DIAGNOSIS — Z13.220 LIPID SCREENING: ICD-10-CM

## 2022-04-04 DIAGNOSIS — G56.21 ULNAR NEUROPATHY AT ELBOW OF RIGHT UPPER EXTREMITY: ICD-10-CM

## 2022-04-04 DIAGNOSIS — Z12.11 SCREEN FOR COLON CANCER: ICD-10-CM

## 2022-04-04 DIAGNOSIS — G56.21 CUBITAL TUNNEL SYNDROME ON RIGHT: ICD-10-CM

## 2022-04-04 DIAGNOSIS — Z01.818 PRE-OPERATIVE GENERAL PHYSICAL EXAMINATION: Primary | ICD-10-CM

## 2022-04-04 DIAGNOSIS — Z13.220 SCREENING FOR HYPERLIPIDEMIA: ICD-10-CM

## 2022-04-04 LAB
ERYTHROCYTE [DISTWIDTH] IN BLOOD BY AUTOMATED COUNT: 13 % (ref 10–15)
HCT VFR BLD AUTO: 44.8 % (ref 40–53)
HGB BLD-MCNC: 15.3 G/DL (ref 13.3–17.7)
MCH RBC QN AUTO: 30.9 PG (ref 26.5–33)
MCHC RBC AUTO-ENTMCNC: 34.2 G/DL (ref 31.5–36.5)
MCV RBC AUTO: 91 FL (ref 78–100)
PLATELET # BLD AUTO: 276 10E3/UL (ref 150–450)
RBC # BLD AUTO: 4.95 10E6/UL (ref 4.4–5.9)
WBC # BLD AUTO: 8.6 10E3/UL (ref 4–11)

## 2022-04-04 PROCEDURE — 80053 COMPREHEN METABOLIC PANEL: CPT | Performed by: INTERNAL MEDICINE

## 2022-04-04 PROCEDURE — 36415 COLL VENOUS BLD VENIPUNCTURE: CPT | Performed by: INTERNAL MEDICINE

## 2022-04-04 PROCEDURE — 80061 LIPID PANEL: CPT | Performed by: INTERNAL MEDICINE

## 2022-04-04 PROCEDURE — 99214 OFFICE O/P EST MOD 30 MIN: CPT | Performed by: INTERNAL MEDICINE

## 2022-04-04 PROCEDURE — 93000 ELECTROCARDIOGRAM COMPLETE: CPT | Performed by: INTERNAL MEDICINE

## 2022-04-04 PROCEDURE — 85027 COMPLETE CBC AUTOMATED: CPT | Performed by: INTERNAL MEDICINE

## 2022-04-04 ASSESSMENT — PAIN SCALES - GENERAL: PAINLEVEL: SEVERE PAIN (6)

## 2022-04-04 NOTE — PROGRESS NOTES
[address], [address]  [facility] [address]  Phone: [phone]  Primary Provider: Tatiana Samano  Pre-op Performing Provider: CLYDE CHERRY      PREOPERATIVE EVALUATION:  Today's date: 4/4/2022    Yogesh Alanis is a 46 year old male who presents for a preoperative evaluation.    Surgical Information:  Surgery/Procedure: Right Ulner Nerve   Surgery Location: [facility]  Surgeon: Keith Denny  Surgery Date: 04/08/2022   Time of Surgery: 9:15 AM  Where patient plans to recover: At home with family  Fax number for surgical facility: [phone]    Type of Anesthesia Anticipated: General    Assessment & Plan     The proposed surgical procedure is considered INTERMEDIATE risk.    Problem List Items Addressed This Visit     None      Visit Diagnoses     Pre-operative general physical examination    -  Primary    Relevant Orders    EKG 12-lead complete w/read - Clinics (Completed)    CBC with platelets (Completed)    Comprehensive metabolic panel (BMP + Alb, Alk Phos, ALT, AST, Total. Bili, TP)    Ulnar neuropathy at elbow of right upper extremity        Cubital tunnel syndrome on right        Screen for colon cancer        Relevant Orders    Adult Gastro Ref - Procedure Only    Screening for hyperlipidemia        Lipid screening        Relevant Orders    Lipid panel reflex to direct LDL Fasting               Risks and Recommendations:  The patient has the following additional risks and recommendations for perioperative complications:   - No identified additional risk factors other than previously addressed    Medication Instructions:  Patient is on no chronic medications    RECOMMENDATION:  APPROVAL GIVEN to proceed with proposed procedure pending review of diagnostic evaluation.    CBC benign, EKG NSR        Subjective     HPI related to upcoming procedure: right medial epicondyle debridement and in situ decompression of the ulnar  nerve        Preop Questions 4/4/2022   1. Have you ever had a heart attack or stroke? No   2. Have you ever had surgery on your heart or blood vessels, such as a stent placement, a coronary artery bypass, or surgery on an artery in your head, neck, heart, or legs? No   3. Do you have chest pain with activity? No   4. Do you have a history of  heart failure? No   5. Do you currently have a cold, bronchitis or symptoms of other infection? No   6. Do you have a cough, shortness of breath, or wheezing? No   7. Do you or anyone in your family have previous history of blood clots? No   8. Do you or does anyone in your family have a serious bleeding problem such as prolonged bleeding following surgeries or cuts? No   9. Have you ever had problems with anemia or been told to take iron pills? No   10. Have you had any abnormal blood loss such as black, tarry or bloody stools? No   11. Have you ever had a blood transfusion? No   12. Are you willing to have a blood transfusion if it is medically needed before, during, or after your surgery? Yes   13. Have you or any of your relatives ever had problems with anesthesia? No   14. Do you have sleep apnea, excessive snoring or daytime drowsiness? No   15. Do you have any artifical heart valves or other implanted medical devices like a pacemaker, defibrillator, or continuous glucose monitor? No   16. Do you have artificial joints? No   17. Are you allergic to latex? No       Health Care Directive:  Patient does not have a Health Care Directive or Living Will: Discussed advance care planning with patient; information given to patient to review.    Preoperative Review of :   reviewed - no record of controlled substances prescribed.      Status of Chronic Conditions:  See problem list for active medical problems.  Problems all longstanding and stable, except as noted/documented.  See ROS for pertinent symptoms related to these conditions.      Review of Systems  Constitutional,  neuro, ENT, endocrine, pulmonary, cardiac, gastrointestinal, genitourinary, musculoskeletal, integument and psychiatric systems are negative, except as otherwise noted.    Patient Active Problem List    Diagnosis Date Noted     Gastroesophageal reflux disease without esophagitis 2017     Priority: Medium     CARDIOVASCULAR SCREENING; LDL GOAL LESS THAN 160 10/31/2010     Priority: Medium     Environmental allergies      Priority: Medium      Past Medical History:   Diagnosis Date     Anxiety      Double vision      Environmental allergies      Headache(784.0) 2014    negative work up/resolved     Hearing loss      Heartburn      Indigestion      Nasal congestion      Problems related to lack of adequate sleep      Sneezing      Sore throat      Tinnitus      Past Surgical History:   Procedure Laterality Date     HC REPAIR ACHILLES TENDON,PRIMARY      left     SURGICAL HISTORY OF -       bilateral knee arthroscopies     ZZC APPENDECTOMY       No current outpatient medications on file.       Allergies   Allergen Reactions     Amoxicillin Other (See Comments)     High blood pressure   Hypertension and high blood pressure     Hydrocodone-Acetaminophen      Precipitates migraine headaches        Social History     Tobacco Use     Smoking status: Former Smoker     Quit date: 1999     Years since quittin.9     Smokeless tobacco: Never Used   Substance Use Topics     Alcohol use: Yes     Comment: social     Family History   Problem Relation Age of Onset     Breast Cancer Mother      C.A.D. Paternal Grandmother      Asthma Sister      Diabetes No family hx of      Hypertension No family hx of      Cerebrovascular Disease No family hx of      Cancer - colorectal No family hx of      Prostate Cancer No family hx of      Alcohol/Drug No family hx of      History   Drug Use No         Objective     /81 (BP Location: Right arm, Patient Position: Chair, Cuff Size: Adult Large)   Pulse 78   Temp 96.8  " F (36  C) (Temporal)   Resp 17   Ht 1.854 m (6' 1\")   Wt 97.5 kg (215 lb)   SpO2 97%   BMI 28.37 kg/m      Physical Exam  GENERAL APPEARANCE: healthy, alert and no distress  HENT: ear canals and TM's normal and nose and mouth without ulcers or lesions  RESP: lungs clear to auscultation - no rales, rhonchi or wheezes  CV: regular rate and rhythm, normal S1 S2, no S3 or S4 and no murmur, click or rub   ABDOMEN: soft, nontender, no HSM or masses and bowel sounds normal  NEURO: Normal strength and tone, sensory exam grossly normal, mentation intact and speech normal    No results for input(s): HGB, PLT, INR, NA, POTASSIUM, CR, A1C in the last 20129 hours.     Diagnostics:  Labs pending at this time.  Results will be reviewed when available.   EKG: appears normal, NSR, normal axis, normal intervals, no acute ST/T changes c/w ischemia, no LVH by voltage criteria, unchanged from previous tracings    Revised Cardiac Risk Index (RCRI):  The patient has the following serious cardiovascular risks for perioperative complications:   - No serious cardiac risks = 0 points     RCRI Interpretation: 0 points: Class I (very low risk - 0.4% complication rate)           Signed Electronically by: Xiomara Bergeron MD  Copy of this evaluation report is provided to requesting physician.      "

## 2022-04-05 ENCOUNTER — TELEPHONE (OUTPATIENT)
Dept: GASTROENTEROLOGY | Facility: CLINIC | Age: 47
End: 2022-04-05
Payer: OTHER MISCELLANEOUS

## 2022-04-05 DIAGNOSIS — Z11.59 ENCOUNTER FOR SCREENING FOR OTHER VIRAL DISEASES: Primary | ICD-10-CM

## 2022-04-05 LAB
ALBUMIN SERPL-MCNC: 4.1 G/DL (ref 3.4–5)
ALP SERPL-CCNC: 47 U/L (ref 40–150)
ALT SERPL W P-5'-P-CCNC: 119 U/L (ref 0–70)
ANION GAP SERPL CALCULATED.3IONS-SCNC: 7 MMOL/L (ref 3–14)
AST SERPL W P-5'-P-CCNC: 48 U/L (ref 0–45)
BILIRUB SERPL-MCNC: 1.3 MG/DL (ref 0.2–1.3)
BUN SERPL-MCNC: 14 MG/DL (ref 7–30)
CALCIUM SERPL-MCNC: 9.5 MG/DL (ref 8.5–10.1)
CHLORIDE BLD-SCNC: 107 MMOL/L (ref 94–109)
CHOLEST SERPL-MCNC: 193 MG/DL
CO2 SERPL-SCNC: 25 MMOL/L (ref 20–32)
CREAT SERPL-MCNC: 1.01 MG/DL (ref 0.66–1.25)
FASTING STATUS PATIENT QL REPORTED: NO
GFR SERPL CREATININE-BSD FRML MDRD: >90 ML/MIN/1.73M2
GLUCOSE BLD-MCNC: 98 MG/DL (ref 70–99)
HDLC SERPL-MCNC: 43 MG/DL
LDLC SERPL CALC-MCNC: 125 MG/DL
NONHDLC SERPL-MCNC: 150 MG/DL
POTASSIUM BLD-SCNC: 3.9 MMOL/L (ref 3.4–5.3)
PROT SERPL-MCNC: 7.9 G/DL (ref 6.8–8.8)
SODIUM SERPL-SCNC: 139 MMOL/L (ref 133–144)
TRIGL SERPL-MCNC: 124 MG/DL

## 2022-04-05 NOTE — TELEPHONE ENCOUNTER
Screening Questions  BlueKIND OF PREP RedLOCATION [review exclusion criteria] GreenSEDATION TYPE  1. Have you had a positive covid test in the last 90 days? N     2. Are you active on mychart? Y    3. What insurance is in the chart? MEDICA     3.   Ordering/Referring Provider: Xiomara Bergeron    4. BMI 28.37 [BMI OVER 40-EXTENDED PREP]  If greater than 40 review exclusion criteria [PAC APPT IF @ [facility]]        5.  Respiratory Screening :  [If yes to any of the following HOSPITAL setting only]     Do you use daily home oxygen? N    Do you have mod to severe Obstructive Sleep Apnea? N  [OKAY @ [facility] SH PH RI]   Do you have Pulmonary Hypertension? N     Do you have UNCONTROLLED asthma? N        6.   Have you had a heart or lung transplant? N      7.   Are you currently on dialysis? N [ If yes, G-PREP & HOSPITAL setting only]     8.   Do you have chronic kidney disease? N [ If yes, G-PREP ]    9.   Have you had a stroke or Transient ischemic attack (TIA - aka  mini stroke ) within 6 months?  N (If yes, please review exclusion criteria)    10.   In the past 6 months, have you had any heart related issues including cardiomyopathy or heart attack? N           If yes, did it require cardiac stenting or other implantable device? N      11.   Do you have any implantable devices in your body (pacemaker, defib, LVAD)? N (If yes, please review exclusion criteria)    12.   Do you take nitroglycerin? N           If yes, how often? N  (if yes, HOSPITAL setting ONLY)    13.   Are you currently taking any blood thinners? N           [IF YES, INFORM PATIENT TO FOLLOW UP W/ ORDERING PROVIDER FOR BRIDGING INSTRUCTIONS]     14.   Do you have a diagnosis of diabetes? N   [ If yes, G-PREP ]    15.   [FEMALES] Are you currently pregnant?     If yes, how many weeks?     16.   Are you taking any prescription pain medications on a routine schedule?  N  [ If yes, EXTENDED PREP.] [If yes, MAC]    17.   Do you have any chemical  dependencies such as alcohol, street drugs, or methadone?  N [If yes, MAC]    18.   Do you have any history of post-traumatic stress syndrome, severe anxiety or history of psychosis?  N  [If yes, MAC]    19.   Do you have a significant intellectual disability?  N [If yes, MAC]    20.   Do you transfer independently?  Y    21.  On a regular basis do you go 3-5 days between bowel movements? N   [ If yes, EXTENDED PREP.]    22.   Preferred LOCAL Pharmacy for Pre Prescription      [facility]  [id number] - [place], [place] - [address]      Scheduling Details      Caller : [facility] scheduling  (Please ask for phone number if not scheduled by patient)    Type of Procedure Scheduled: Colon  Which Colonoscopy Prep was Sent?: john FELIZ CF PATIENTS & GROEN'S PATIENTS NEEDS EXTENDED PREP  Surgeon: Maria  Date of Procedure: 4/27/22  Location:       Sedation Type: CS  Conscious Sedation- Needs  for 6 hours after the procedure  MAC/General-Needs  for 24 hours after procedure    Pre-op Required at [facility] [place], [place] and OR for MAC sedation:   (advise patient they will need a pre-op prior to procedure -)      Informed patient they will need an adult  y  Cannot take any type of public or medical transportation alone    [facility]Pro[facility] or Externally: [place] 4/23    Confirmed Nurse will call to complete assessment Y    Additional comments: n

## 2022-04-06 NOTE — RESULT ENCOUNTER NOTE
Lauri Sinha, reviewed rest of your labs,Lipid panel shows LDL at 125, HDL: 43, triglyceride normal, please continue with lifestyle changes low-fat low-cholesterol diet, increase exercise activities as tolerated,Chemistry panel shows normal electrolytes, normal kidney function test,Liver enzymes called AST and ALT are both elevated.  Please abstain from any alcohol intake as such can be toxic to the liver.  Also elevated liver enzymes could be a sign of fatty liver.  Please increase exercise activities and weight loss will help with fatty liver.Please you will need to repeat your liver enzymes the next 2 weeks, if persistent elevation will need to do an ultrasound of the liver.  Please call the lab in next 2  weeks to schedule repeat liver enzymes.CBC shows normal white blood cell count, normal hemoglobin hematocrit there is no anemia and normal platelet count.    I would advise that you repeat your liver enzymes before surgery as well we do not know the baseline of your liver enzymes if this is an acute increase in the liver enzymes due to some viral process or whether this is a chronic condition affecting your liver such as fatty liver.Any further questions please let me knowDr. Bergeron

## 2022-04-11 ENCOUNTER — TELEPHONE (OUTPATIENT)
Dept: FAMILY MEDICINE | Facility: CLINIC | Age: 47
End: 2022-04-11
Payer: COMMERCIAL

## 2022-04-11 DIAGNOSIS — Z13.220 SCREENING FOR HYPERLIPIDEMIA: Primary | ICD-10-CM

## 2022-04-11 DIAGNOSIS — R74.8 ELEVATED LIVER ENZYMES: ICD-10-CM

## 2022-04-11 NOTE — TELEPHONE ENCOUNTER
Notified pt and scheduled follow up.  Pt wanted to wait 1 month to recheck cholesterol panel at same time.    Pended a future FLP and CMP in this encounter if those are the labs you wish to be ordered.  No call back needed at this point.    Pt wanting to wait to recheck liver enzymes so he can recheck FLP at same time.  Both before leaving for [place].     Rehana Bernstein, RN  [facility] RN Triage Team

## 2022-04-25 ENCOUNTER — LAB (OUTPATIENT)
Dept: URGENT CARE | Facility: URGENT CARE | Age: 47
End: 2022-04-25
Attending: COLON & RECTAL SURGERY
Payer: COMMERCIAL

## 2022-04-25 DIAGNOSIS — Z11.59 ENCOUNTER FOR SCREENING FOR OTHER VIRAL DISEASES: ICD-10-CM

## 2022-04-25 PROCEDURE — U0005 INFEC AGEN DETEC AMPLI PROBE: HCPCS

## 2022-04-25 PROCEDURE — U0003 INFECTIOUS AGENT DETECTION BY NUCLEIC ACID (DNA OR RNA); SEVERE ACUTE RESPIRATORY SYNDROME CORONAVIRUS 2 (SARS-COV-2) (CORONAVIRUS DISEASE [COVID-19]), AMPLIFIED PROBE TECHNIQUE, MAKING USE OF HIGH THROUGHPUT TECHNOLOGIES AS DESCRIBED BY CMS-2020-01-R: HCPCS

## 2022-04-26 LAB — SARS-COV-2 RNA RESP QL NAA+PROBE: NEGATIVE

## 2022-04-27 ENCOUNTER — HOSPITAL ENCOUNTER (OUTPATIENT)
Facility: CLINIC | Age: 47
Discharge: HOME | End: 2022-04-27
Attending: COLON & RECTAL SURGERY | Admitting: COLON & RECTAL SURGERY
Payer: COMMERCIAL

## 2022-04-27 VITALS
BODY MASS INDEX: 28.1 KG/M2 | DIASTOLIC BLOOD PRESSURE: 98 MMHG | WEIGHT: 212 LBS | OXYGEN SATURATION: 96 % | SYSTOLIC BLOOD PRESSURE: 145 MMHG | HEART RATE: 74 BPM | RESPIRATION RATE: 12 BRPM | HEIGHT: 73 IN

## 2022-04-27 LAB — COLONOSCOPY: NORMAL

## 2022-04-27 PROCEDURE — 258N000003 HC RX IP 258 OP 636: Performed by: COLON & RECTAL SURGERY

## 2022-04-27 PROCEDURE — 45385 COLONOSCOPY W/LESION REMOVAL: CPT | Performed by: COLON & RECTAL SURGERY

## 2022-04-27 PROCEDURE — 250N000011 HC RX IP 250 OP 636: Performed by: COLON & RECTAL SURGERY

## 2022-04-27 PROCEDURE — 88305 TISSUE EXAM BY PATHOLOGIST: CPT | Mod: TC | Performed by: COLON & RECTAL SURGERY

## 2022-04-27 PROCEDURE — G0500 MOD SEDAT ENDO SERVICE >5YRS: HCPCS | Performed by: COLON & RECTAL SURGERY

## 2022-04-27 RX ORDER — FENTANYL CITRATE 50 UG/ML
INJECTION, SOLUTION INTRAMUSCULAR; INTRAVENOUS PRN
Status: COMPLETED | OUTPATIENT
Start: 2022-04-27 | End: 2022-04-27

## 2022-04-27 RX ORDER — SODIUM CHLORIDE 9 MG/ML
INJECTION, SOLUTION INTRAVENOUS CONTINUOUS PRN
Status: COMPLETED | OUTPATIENT
Start: 2022-04-27 | End: 2022-04-27

## 2022-04-27 RX ORDER — ONDANSETRON 2 MG/ML
4 INJECTION INTRAMUSCULAR; INTRAVENOUS
Status: DISCONTINUED | OUTPATIENT
Start: 2022-04-27 | End: 2022-04-27 | Stop reason: HOSPADM

## 2022-04-27 RX ORDER — LIDOCAINE 40 MG/G
CREAM TOPICAL
Status: DISCONTINUED | OUTPATIENT
Start: 2022-04-27 | End: 2022-04-27 | Stop reason: HOSPADM

## 2022-04-27 RX ADMIN — SODIUM CHLORIDE 500 ML: 0.9 INJECTION, SOLUTION INTRAVENOUS at 13:47

## 2022-04-27 RX ADMIN — FENTANYL CITRATE 100 MCG: 50 INJECTION, SOLUTION INTRAMUSCULAR; INTRAVENOUS at 13:59

## 2022-04-27 RX ADMIN — MIDAZOLAM 1 MG: 1 INJECTION INTRAMUSCULAR; INTRAVENOUS at 14:01

## 2022-04-27 RX ADMIN — MIDAZOLAM 1 MG: 1 INJECTION INTRAMUSCULAR; INTRAVENOUS at 14:02

## 2022-04-27 RX ADMIN — MIDAZOLAM 2 MG: 1 INJECTION INTRAMUSCULAR; INTRAVENOUS at 14:00

## 2022-04-27 NOTE — H&P
Colon & Rectal Surgery History and Physical  Pre-Endoscopy Procedure Note    History of Present Illness   I have been asked by Dr. Bergeron to evaluate this 46 year old male for colorectal cancer screening. He currently denies any abdominal pain, weight loss, bleeding per rectum, or recent change in bowel habits.    Past Medical History  Diagnosis Date     Anxiety      Double vision      Environmental allergies      Hearing loss      Heartburn      Indigestion      Nasal congestion      Tinnitus        Past Surgical History  Procedure Laterality Date     REPAIR ACHILLES TENDON,PRIMARY      left     SURGICAL HISTORY OF -       bilateral knee arthroscopies     APPENDECTOMY          Medications  No medications prior to admission.       Allergies  Allergen Reactions     Amoxicillin      High blood pressure      Hydrocodone-Acetaminophen      Precipitates migraine headaches        Family History   Family history includes Asthma in his sister; Breast Cancer in his mother; C.A.D. in his paternal grandmother.     Social History   He reports that he quit smoking about 23 years ago. He has never used smokeless tobacco. He reports current alcohol use. He reports that he does not use drugs.    Review of Systems   Constitutional:  No fever, weight change or fatigue.    Eyes:     No dry eyes or vision changes.   Ears/Nose/Throat/Neck:  No oral ulcers, sore throat or voice change.    Cardiovascular:   No palpitations, syncope, angina or edema.   Respiratory:    No chest pain, excessive sleepiness, shortness of breath or hemoptysis.    Gastrointestinal:   No abdominal pain, nausea, vomiting, diarrhea or heartburn.    Genitourinary:   No dysuria, hematuria, urinary retention or urinary frequency.   Musculoskeletal:  No joint swelling or arthralgias.    Dermatologic:  No skin rash or other skin changes.   Neurologic:    No focal weakness or numbness. No neuropathy.   Psychiatric:    No depression, anxiety, suicidal ideation, or  "paranoid ideation.   Endocrine:   No cold or heat intolerance, polydipsia, hirsutism, change in libido, or flushing.   Hematology/Lymphatic:  No bleeding or lymphadenopathy.    Allergy/Immunology:  No rhinitis or hives.     Physical Exam   Vitals:  Blood pressure 120/79, pulse 63, resp. rate 15, height 1.854 m (6' 1\"), weight 96.2 kg (212 lb), SpO2 96 %.    General:  Alert and oriented to person, place and time   Airway: Normal oropharyngeal airway and neck mobility   Lungs:  Clear bilaterally   Heart:  Regular rate and rhythm   Abdomen: Soft, NT, ND, no masses   Extremities: Warm, good capillary refill    ASA Grade: II (mild systemic disease)    Impression: Cleared for use of conscious sedation for colorectal cancer screening    Plan: Proceed with colonoscopy     Laisha Sifuentes MD  [facility]  [phone]  "

## 2022-04-29 PROCEDURE — 88305 TISSUE EXAM BY PATHOLOGIST: CPT | Mod: 26 | Performed by: PATHOLOGY

## 2022-05-04 ENCOUNTER — LAB (OUTPATIENT)
Dept: LAB | Facility: CLINIC | Age: 47
End: 2022-05-04
Payer: COMMERCIAL

## 2022-05-04 DIAGNOSIS — Z30.2 ENCOUNTER FOR STERILIZATION: Primary | ICD-10-CM

## 2022-05-04 DIAGNOSIS — Z13.220 SCREENING FOR HYPERLIPIDEMIA: ICD-10-CM

## 2022-05-04 DIAGNOSIS — Z11.59 NEED FOR HEPATITIS C SCREENING TEST: ICD-10-CM

## 2022-05-04 DIAGNOSIS — Z11.4 SCREENING FOR HIV (HUMAN IMMUNODEFICIENCY VIRUS): ICD-10-CM

## 2022-05-04 LAB
CHOLEST SERPL-MCNC: 163 MG/DL
FASTING STATUS PATIENT QL REPORTED: YES
HCV AB SERPL QL IA: NONREACTIVE
HDLC SERPL-MCNC: 46 MG/DL
HIV 1+2 AB+HIV1 P24 AG SERPL QL IA: NONREACTIVE
LDLC SERPL CALC-MCNC: 99 MG/DL
NONHDLC SERPL-MCNC: 117 MG/DL
TRIGL SERPL-MCNC: 88 MG/DL

## 2022-05-04 PROCEDURE — 80061 LIPID PANEL: CPT

## 2022-05-04 PROCEDURE — 86803 HEPATITIS C AB TEST: CPT

## 2022-05-04 PROCEDURE — 84460 ALANINE AMINO (ALT) (SGPT): CPT | Performed by: INTERNAL MEDICINE

## 2022-05-04 PROCEDURE — 36415 COLL VENOUS BLD VENIPUNCTURE: CPT

## 2022-05-04 PROCEDURE — 87389 HIV-1 AG W/HIV-1&-2 AB AG IA: CPT

## 2022-05-04 PROCEDURE — 89321 SEMEN ANAL SPERM DETECTION: CPT | Performed by: UROLOGY

## 2022-05-04 PROCEDURE — 84450 TRANSFERASE (AST) (SGOT): CPT | Performed by: INTERNAL MEDICINE

## 2022-05-05 ENCOUNTER — MYC MEDICAL ADVICE (OUTPATIENT)
Dept: FAMILY MEDICINE | Facility: CLINIC | Age: 47
End: 2022-05-05
Payer: COMMERCIAL

## 2022-05-05 NOTE — TELEPHONE ENCOUNTER
Dr. Bergeron, please advise on how to respond to the patient message.     Miri Vargas RN  [facility]

## 2022-05-06 ENCOUNTER — TELEPHONE (OUTPATIENT)
Dept: FAMILY MEDICINE | Facility: CLINIC | Age: 47
End: 2022-05-06
Payer: COMMERCIAL

## 2022-05-06 DIAGNOSIS — R74.01 ELEVATED ALT MEASUREMENT: Primary | ICD-10-CM

## 2022-05-06 DIAGNOSIS — R79.89 ELEVATED LFTS: Primary | ICD-10-CM

## 2022-05-06 LAB
ALT SERPL W P-5'-P-CCNC: 88 U/L (ref 0–70)
AST SERPL W P-5'-P-CCNC: 34 U/L (ref 0–45)

## 2022-05-06 NOTE — TELEPHONE ENCOUNTER
----- Message from Xiomara Bergeron MD sent at 5/6/2022  5:01 AM CDT -----  Lab- Please can we not charge patient for Hep C and HIV as patient did not request.  Thank you  Dr Bergeron

## 2022-05-27 ENCOUNTER — OFFICE VISIT (OUTPATIENT)
Dept: UROLOGY | Facility: CLINIC | Age: 47
End: 2022-05-27
Payer: COMMERCIAL

## 2022-05-27 VITALS
DIASTOLIC BLOOD PRESSURE: 74 MMHG | BODY MASS INDEX: 28.1 KG/M2 | HEIGHT: 73 IN | SYSTOLIC BLOOD PRESSURE: 120 MMHG | WEIGHT: 212 LBS

## 2022-05-27 DIAGNOSIS — Z30.2 ENCOUNTER FOR STERILIZATION: Primary | ICD-10-CM

## 2022-05-27 PROCEDURE — 99212 OFFICE O/P EST SF 10 MIN: CPT | Performed by: UROLOGY

## 2022-05-27 ASSESSMENT — PAIN SCALES - GENERAL: PAINLEVEL: MILD PAIN (2)

## 2022-05-27 NOTE — PROGRESS NOTES
Urology    Bharath requested postoperative exam after vasectomy.  He has had his postvasectomy semen specimen checked and it is negative for sperm.    He says he has done well since his vasectomy but sometimes the left testicle hurts a bit when he exercises.  He just wanted to be checked out to make sure everything is okay.    His examination is normal.  No evidence of infection.  Normal small amount of scarring of the vasa on each side.    He was provided reassurance and feels satisfied with this.  We will follow-up as needed in the future.

## 2022-05-27 NOTE — NURSING NOTE
Chief Complaint   Patient presents with     Pain after vasectomy     Pt has been having pain on right side, pt states he just recently started exercising again.    Aleah Fleming, CMA

## 2022-05-27 NOTE — LETTER
5/27/2022       RE: Yogesh Alanis  [address]  [place] [facility] [address]     Dear Colleague,    Thank you for referring your patient, Yogesh Alanis, to the [facility] [place] [facility]. Please see a copy of my visit note below.    Urology    Bharath requested postoperative exam after vasectomy.  He has had his postvasectomy semen specimen checked and it is negative for sperm.    He says he has done well since his vasectomy but sometimes the left testicle hurts a bit when he exercises.  He just wanted to be checked out to make sure everything is okay.    His examination is normal.  No evidence of infection.  Normal small amount of scarring of the vasa on each side.    He was provided reassurance and feels satisfied with this.  We will follow-up as needed in the future.    Sincerely,    Marcellus Samayoa MD

## 2022-09-09 ENCOUNTER — TRANSFERRED RECORDS (OUTPATIENT)
Dept: HEALTH INFORMATION MANAGEMENT | Facility: CLINIC | Age: 47
End: 2022-09-09

## 2022-11-04 ENCOUNTER — TRANSFERRED RECORDS (OUTPATIENT)
Dept: HEALTH INFORMATION MANAGEMENT | Facility: CLINIC | Age: 47
End: 2022-11-04

## 2022-12-26 ENCOUNTER — HEALTH MAINTENANCE LETTER (OUTPATIENT)
Age: 47
End: 2022-12-26

## 2023-04-22 ENCOUNTER — HEALTH MAINTENANCE LETTER (OUTPATIENT)
Age: 48
End: 2023-04-22

## 2023-07-07 ENCOUNTER — TRANSFERRED RECORDS (OUTPATIENT)
Dept: HEALTH INFORMATION MANAGEMENT | Facility: CLINIC | Age: 48
End: 2023-07-07
Payer: COMMERCIAL

## 2023-11-06 ENCOUNTER — OFFICE VISIT (OUTPATIENT)
Dept: FAMILY MEDICINE | Facility: CLINIC | Age: 48
End: 2023-11-06
Payer: COMMERCIAL

## 2023-11-06 VITALS
HEIGHT: 73 IN | WEIGHT: 205 LBS | DIASTOLIC BLOOD PRESSURE: 74 MMHG | BODY MASS INDEX: 27.17 KG/M2 | OXYGEN SATURATION: 96 % | TEMPERATURE: 98.6 F | RESPIRATION RATE: 16 BRPM | HEART RATE: 81 BPM | SYSTOLIC BLOOD PRESSURE: 116 MMHG

## 2023-11-06 DIAGNOSIS — Z00.00 WELL ADULT EXAM: ICD-10-CM

## 2023-11-06 DIAGNOSIS — Z13.220 LIPID SCREENING: ICD-10-CM

## 2023-11-06 DIAGNOSIS — Z13.228 SCREENING FOR METABOLIC DISORDER: ICD-10-CM

## 2023-11-06 PROCEDURE — 99396 PREV VISIT EST AGE 40-64: CPT | Performed by: FAMILY MEDICINE

## 2023-11-06 PROCEDURE — 36415 COLL VENOUS BLD VENIPUNCTURE: CPT | Performed by: FAMILY MEDICINE

## 2023-11-06 PROCEDURE — 80061 LIPID PANEL: CPT | Performed by: FAMILY MEDICINE

## 2023-11-06 PROCEDURE — 80053 COMPREHEN METABOLIC PANEL: CPT | Performed by: FAMILY MEDICINE

## 2023-11-06 SDOH — HEALTH STABILITY: PHYSICAL HEALTH: ON AVERAGE, HOW MANY MINUTES DO YOU ENGAGE IN EXERCISE AT THIS LEVEL?: PATIENT DECLINED

## 2023-11-06 SDOH — HEALTH STABILITY: PHYSICAL HEALTH
ON AVERAGE, HOW MANY DAYS PER WEEK DO YOU ENGAGE IN MODERATE TO STRENUOUS EXERCISE (LIKE A BRISK WALK)?: PATIENT DECLINED

## 2023-11-06 ASSESSMENT — ENCOUNTER SYMPTOMS
DYSURIA: 0
SHORTNESS OF BREATH: 0
EYE PAIN: 0
PARESTHESIAS: 0
HEMATURIA: 0
JOINT SWELLING: 0
PALPITATIONS: 0
DIZZINESS: 0
CONSTIPATION: 0
NERVOUS/ANXIOUS: 1
CHILLS: 0
COUGH: 0
MYALGIAS: 1
WEAKNESS: 0
ABDOMINAL PAIN: 0
FEVER: 0
HEMATOCHEZIA: 0
NAUSEA: 0
SORE THROAT: 0
HEADACHES: 0
DIARRHEA: 0
ARTHRALGIAS: 1
FREQUENCY: 0

## 2023-11-06 ASSESSMENT — LIFESTYLE VARIABLES
HOW OFTEN DO YOU HAVE A DRINK CONTAINING ALCOHOL: PATIENT DECLINED
HOW OFTEN DO YOU HAVE SIX OR MORE DRINKS ON ONE OCCASION: PATIENT DECLINED
SKIP TO QUESTIONS 9-10: 0
AUDIT-C TOTAL SCORE: -1
HOW MANY STANDARD DRINKS CONTAINING ALCOHOL DO YOU HAVE ON A TYPICAL DAY: PATIENT DECLINED

## 2023-11-06 ASSESSMENT — SOCIAL DETERMINANTS OF HEALTH (SDOH)
HOW OFTEN DO YOU GET TOGETHER WITH FRIENDS OR RELATIVES?: PATIENT DECLINED
HOW OFTEN DO YOU ATTENT MEETINGS OF THE CLUB OR ORGANIZATION YOU BELONG TO?: PATIENT DECLINED
HOW OFTEN DO YOU ATTEND CHURCH OR RELIGIOUS SERVICES?: PATIENT DECLINED
ARE YOU MARRIED, WIDOWED, DIVORCED, SEPARATED, NEVER MARRIED, OR LIVING WITH A PARTNER?: PATIENT DECLINED
IN A TYPICAL WEEK, HOW MANY TIMES DO YOU TALK ON THE PHONE WITH FAMILY, FRIENDS, OR NEIGHBORS?: PATIENT DECLINED
DO YOU BELONG TO ANY CLUBS OR ORGANIZATIONS SUCH AS CHURCH GROUPS UNIONS, FRATERNAL OR ATHLETIC GROUPS, OR SCHOOL GROUPS?: PATIENT DECLINED

## 2023-11-06 ASSESSMENT — PAIN SCALES - GENERAL: PAINLEVEL: NO PAIN (0)

## 2023-11-06 NOTE — PROGRESS NOTES
"SUBJECTIVE:   CC: Bharaht is an 48 year old who presents for preventative health visit.       11/6/2023     3:37 PM   Additional Questions   Roomed by PHOENIX Campbell   Accompanied by Self   Worked as a retired  .  Doing well .  Denies any health concern   Was small lump under chin 2 weeks ago now improved .    Healthy Habits:     Getting at least 3 servings of Calcium per day:  Yes    Bi-annual eye exam:  Yes    Dental care twice a year:  NO    Sleep apnea or symptoms of sleep apnea:  None    Diet:  Regular (no restrictions)    Frequency of exercise:  2-3 days/week    Duration of exercise:  30-45 minutes    Taking medications regularly:  Yes    Medication side effects:  Other    Additional concerns today:  No      Today's PHQ-2 Score:       11/6/2023     3:19 PM   PHQ-2 ( 1999 [employer])   Q1: Little interest or pleasure in doing things 0   Q2: Feeling down, depressed or hopeless 0   PHQ-2 Score 0   Q1: Little interest or pleasure in doing things Not at all   Q2: Feeling down, depressed or hopeless Not at all   PHQ-2 Score 0         Have you ever done Advance Care Planning? (For example, a Health Directive, POLST, or a discussion with a medical provider or your loved ones about your wishes): Yes, advance care planning is on file.    Social History     Tobacco Use    Smoking status: Former    Smokeless tobacco: Former     Types: Chew   Substance Use Topics    Alcohol use: Yes     Comment: social             11/6/2023     3:19 PM   Alcohol Use   Prescreen: >3 drinks/day or >7 drinks/week? No       Last PSA: No results found for: \"PSA\"    Reviewed orders with patient. Reviewed health maintenance and updated orders accordingly - Yes    Reviewed and updated as needed this visit by clinical staff   Tobacco  Allergies  Meds              Reviewed and updated as needed this visit by Provider                 Past Medical History:   Diagnosis Date    Anxiety     Double vision     Environmental allergies     " "Headache(784.0) 12/19/2014    negative work up/resolved    Hearing loss     Heartburn     Indigestion     Nasal congestion     Problems related to lack of adequate sleep     Sneezing     Sore throat     Tinnitus       Past Surgical History:   Procedure Laterality Date    AS REVISE ULNAR NERVE AT ELBOW      COLONOSCOPY N/A 4/27/2022    Procedure: COLONOSCOPY, FLEXIBLE, WITH LESION REMOVAL USING SNARE;  Surgeon: Laisha Sifuentes MD;  Location:  GI    flail  chest      HC REPAIR ACHILLES TENDON,PRIMARY      left    knee surgery      SHOULDER SURGERY      SURGICAL HISTORY OF -       bilateral knee arthroscopies    ZZC APPENDECTOMY         Review of Systems   Constitutional:  Negative for chills and fever.   HENT:  Negative for congestion, ear pain, hearing loss and sore throat.    Eyes:  Negative for pain and visual disturbance.   Respiratory:  Negative for cough and shortness of breath.    Cardiovascular:  Negative for chest pain, palpitations and peripheral edema.   Gastrointestinal:  Negative for abdominal pain, constipation, diarrhea, hematochezia and nausea.   Genitourinary:  Negative for dysuria, frequency, genital sores, hematuria, impotence, penile discharge and urgency.   Musculoskeletal:  Positive for arthralgias and myalgias. Negative for joint swelling.   Skin:  Negative for rash.   Neurological:  Negative for dizziness, weakness, headaches and paresthesias.   Psychiatric/Behavioral:  Negative for mood changes. The patient is nervous/anxious.          OBJECTIVE:   /74 (BP Location: Right arm, Patient Position: Sitting, Cuff Size: Adult Regular)   Pulse 81   Temp 98.6  F (37  C) (Oral)   Resp 16   Ht 1.854 m (6' 1\")   Wt 93 kg (205 lb)   SpO2 96%   BMI 27.05 kg/m      Physical Exam  GENERAL: healthy, alert and no distress  EYES: Eyes grossly normal to inspection, PERRL and conjunctivae and sclerae normal  HENT: ear canals and TM's normal, nose and mouth without ulcers or lesions  NECK: no " "adenopathy, no asymmetry, masses, or scars and thyroid normal to palpation  RESP: lungs clear to auscultation - no rales, rhonchi or wheezes  CV: regular rate and rhythm, normal S1 S2, no S3 or S4, no murmur, click or rub, no peripheral edema and peripheral pulses strong  ABDOMEN: soft, nontender, no hepatosplenomegaly, no masses and bowel sounds normal  MS: no gross musculoskeletal defects noted, no edema  SKIN: no suspicious lesions or rashes  NEURO: Normal strength and tone, mentation intact and speech normal  PSYCH: mentation appears normal, affect normal/bright    Diagnostic Test Results:  Labs reviewed in Epic    ASSESSMENT/PLAN:   (Z00.00) Well adult exam  Comment: Discussed healthy eating   Plan: Discussed maintaining ideal weight   Discussed regular exercise .    (Z13.220) Lipid screening  Comment:   Plan: Lipid panel reflex to direct LDL Fasting            (Z13.228) Screening for metabolic disorder  Comment:   Plan: Comprehensive metabolic panel (BMP + Alb, Alk         Phos, ALT, AST, Total. Bili, TP)          No lump palpable on clinical exam   Lump under chin likely was a lymph node will monitor .      COUNSELING:   Reviewed preventive health counseling, as reflected in patient instructions       Regular exercise       Healthy diet/nutrition       Vision screening       Hearing screening      BMI:   Estimated body mass index is 27.05 kg/m  as calculated from the following:    Height as of this encounter: 1.854 m (6' 1\").    Weight as of this encounter: 93 kg (205 lb).   Weight management plan: Discussed healthy diet and exercise guidelines      He reports that he has quit smoking. He has quit using smokeless tobacco.  His smokeless tobacco use included chew.            Taylor Estrella MD  [facility]  Answers submitted by the patient for this visit:  Annual Preventive Visit (Submitted on 11/6/2023)  Chief Complaint: Annual Exam:  Frequency of exercise:: 2-3 days/week  Getting at least " 3 servings of Calcium per day:: Yes  Diet:: Regular (no restrictions)  Taking medications regularly:: Yes  Medication side effects:: Other  Bi-annual eye exam:: Yes  Dental care twice a year:: NO  Sleep apnea or symptoms of sleep apnea:: None  abdominal pain: No  Blood in stool: No  Blood in urine: No  chest pain: No  chills: No  congestion: No  constipation: No  cough: No  diarrhea: No  dizziness: No  ear pain: No  eye pain: No  nervous/anxious: Yes  fever: No  frequency: No  genital sores: No  headaches: No  hearing loss: No  arthralgias: Yes  joint swelling: No  peripheral edema: No  mood changes: No  myalgias: Yes  nausea: No  dysuria: No  palpitations: No  Skin sensation changes: No  sore throat: No  urgency: No  rash: No  shortness of breath: No  visual disturbance: No  weakness: No  impotence: No  penile discharge: No  Additional concerns today:: No  Exercise outside of work (Submitted on 11/6/2023)  Chief Complaint: Annual Exam:  Duration of exercise:: 30-45 minutes

## 2023-11-07 LAB
ALBUMIN SERPL BCG-MCNC: 4.8 G/DL (ref 3.5–5.2)
ALP SERPL-CCNC: 42 U/L (ref 40–129)
ALT SERPL W P-5'-P-CCNC: 42 U/L (ref 0–70)
ANION GAP SERPL CALCULATED.3IONS-SCNC: 13 MMOL/L (ref 7–15)
AST SERPL W P-5'-P-CCNC: 35 U/L (ref 0–45)
BILIRUB SERPL-MCNC: 1.4 MG/DL
BUN SERPL-MCNC: 15.1 MG/DL (ref 6–20)
CALCIUM SERPL-MCNC: 9.6 MG/DL (ref 8.6–10)
CHLORIDE SERPL-SCNC: 100 MMOL/L (ref 98–107)
CHOLEST SERPL-MCNC: 141 MG/DL
CREAT SERPL-MCNC: 0.98 MG/DL (ref 0.67–1.17)
DEPRECATED HCO3 PLAS-SCNC: 24 MMOL/L (ref 22–29)
EGFRCR SERPLBLD CKD-EPI 2021: >90 ML/MIN/1.73M2
GLUCOSE SERPL-MCNC: 86 MG/DL (ref 70–99)
HDLC SERPL-MCNC: 53 MG/DL
LDLC SERPL CALC-MCNC: 78 MG/DL
NONHDLC SERPL-MCNC: 88 MG/DL
POTASSIUM SERPL-SCNC: 4 MMOL/L (ref 3.4–5.3)
PROT SERPL-MCNC: 7.7 G/DL (ref 6.4–8.3)
SODIUM SERPL-SCNC: 137 MMOL/L (ref 135–145)
TRIGL SERPL-MCNC: 49 MG/DL

## 2024-02-09 NOTE — PATIENT INSTRUCTIONS
"   LELAND Elizabeth   [facility]  [address]  [place] [address]     PATIENT NAME: Geoffrey Gleason  : 1949  DATE: 24  MRN: [id number]      Billing Provider: LELAND Elizabeth  Level of Service:   Patient PCP Information       Provider PCP Type    LELAND Elizabeth General            Reason for Visit / Chief Complaint: COPD (Pt is here because copd is acting up.)   Health Maintenance Due   Topic Date Due    Hepatitis C Screening  Never done    Shingles Vaccine (1 of 2) Never done    RSV Vaccine (Age 60+ and Pregnant patients) (1 - 1-dose 60+ series) Never done    Abdominal Aortic Aneurysm Screening  Never done    DEXA Scan  2023          History of Present Illness / Problem Focused Workflow     Geoffrey Gleason presents to the clinic for sob. Pt has copd and has been more sob the past week. He has been taking medications as directed and has not been out of any medications. He is not having difficulty breathing but is just more sob. He denies fever, any other uri symptoms. He states he feels fine other than just being sob. Pt does have oxygen at home but is only taking at night. He states he has been checking his 02 at home as well and has been low. O2 at start of visit is 62%. Warmed fingers, changed fingers of O2 sensor and is still low at 63%. Applied O2 at 2L per NC and did come up to 90% at end of visit which is good for pt. Pt does see Dr. Chen for Pulmonology but has not had recent visit with him. Provider called to Dr. Chen's office and left message for return call to inform of pt status. Pt does have a chronic cough with his copd and also still smokes. States cough is productive with "grey" sputum. Pt was here in November with similar symptoms but oxygen had not dropped below 88%. He did well with abx/steroids from last visit. Pt also would like a refill on his Gabapentin. He denies any other needs at this time. NAD noted.     No results found for: "HGBA1C" " POST VASECTOMY INSTRUCTIONS    1.) If you have any concerns or questions, please contact our office at [phone] and choose option 2.      2.) It is okay to take a shower, however, do not soak in water (bath,swimming, hot tub,etc....) until your incision is healed.    3.) You might notice some swelling, mild bruising, and discomfort for several days after your vasectomy. This is to be expected. For at least the next 24 hours, an ice pack should be applied for 20 minutes every hour that you are awake. Ice will help with discomfort and swelling. Do not place directly on the skin.    4.) No intercourse, strenuous activity or exercise for at least 7-10 days, even if you feel fine.    5.) You need to wear good scrotal support while you are healing. We strongly recommend an athletic supporter or a pair of regular briefs that are one size too small. Boxer briefs do not offer enough support.    6.) Tylenol as directed on the bottle is preferred for discomfort. Please avoid any blood thinning products such as ibuprofen and aspirin (Motrin, Advil, Excedrin, Aleve, ect..) for at least the next week.    7.) It is normal to have mild drainage from the incision area for several days. However, please contact our office if you notice: bright red blood that does not stop after three days, increased pain, heat at the incision, red streaks, foul smelling discharge, or if you start to run a fever.     8.) YOU MUST CONTINUE BIRTH CONTROL UNTIL WE CONFIRM YOUR STERILITY.  This process can take up to a year to complete (rare occurrence).     9.) You have been given a form with specimen cup and instructions for your follow up specimen. You will be cleared once we receive ONE negative specimen. If your specimen comes back positive (sperm seen) you will be asked to repeat the test. This does not mean that your vasectomy has failed.    "    CMP  Sodium   Date Value Ref Range Status   03/03/2022 145 136 - 145 mmol/L Final     Potassium   Date Value Ref Range Status   03/03/2022 3.8 3.5 - 5.1 mmol/L Final     Chloride   Date Value Ref Range Status   03/03/2022 104 98 - 107 mmol/L Final     CO2   Date Value Ref Range Status   03/03/2022 36 (H) 21 - 32 mmol/L Final     Glucose   Date Value Ref Range Status   03/03/2022 101 74 - 106 mg/dL Final     BUN   Date Value Ref Range Status   03/03/2022 6 (L) 7 - 18 mg/dL Final     Creatinine   Date Value Ref Range Status   03/03/2022 0.77 0.70 - 1.30 mg/dL Final     Calcium   Date Value Ref Range Status   03/03/2022 8.3 (L) 8.5 - 10.1 mg/dL Final     Total Protein   Date Value Ref Range Status   03/03/2022 6.0 (L) 6.4 - 8.2 g/dL Final     Albumin   Date Value Ref Range Status   03/03/2022 3.4 (L) 3.5 - 5.0 g/dL Final     Bilirubin, Total   Date Value Ref Range Status   03/03/2022 0.8 0.0 - 1.2 mg/dL Final     Alk Phos   Date Value Ref Range Status   03/03/2022 87 45 - 115 U/L Final     AST   Date Value Ref Range Status   03/03/2022 15 15 - 37 U/L Final     ALT   Date Value Ref Range Status   03/03/2022 21 16 - 61 U/L Final     Anion Gap   Date Value Ref Range Status   03/03/2022 9 7 - 16 mmol/L Final        Lab Results   Component Value Date    WBC 7.84 03/03/2022    RBC 5.34 03/03/2022    HGB 15.7 03/03/2022    HCT 47.4 03/03/2022    MCV 88.8 03/03/2022    MCH 29.4 03/03/2022    MCHC 33.1 03/03/2022    RDW 13.6 03/03/2022     03/03/2022    MPV 10.2 03/03/2022    LYMPH 11.7 (L) 03/03/2022    LYMPH 0.92 (L) 03/03/2022    MONO 7.5 (H) 03/03/2022    EOS 0.24 03/03/2022    BASO 0.03 03/03/2022    EOSINOPHIL 3.1 03/03/2022    BASOPHIL 0.4 03/03/2022        No results found for: "CHOL"  No results found for: "HDL"  No results found for: "LDLCALC"  No results found for: "TRIG"  No results found for: "CHOLHDL"     Wt Readings from Last 3 Encounters:   02/09/24 0944 76.5 kg (168 lb 9.6 oz)   11/28/23 1105 73.7 kg " (162 lb 6.4 oz)   22 1340 74.4 kg (164 lb)        BP Readings from Last 3 Encounters:   24 116/67   23 115/71   22 122/68        Review of Systems     Review of Systems   Constitutional: Negative.    HENT: Negative.     Eyes: Negative.    Respiratory:  Positive for cough and shortness of breath. Negative for apnea and wheezing.    Gastrointestinal: Negative.    Endocrine: Negative.    Genitourinary: Negative.    Musculoskeletal: Negative.    Integumentary:  Negative.   Allergic/Immunologic: Negative.    Neurological: Negative.    Hematological: Negative.    Psychiatric/Behavioral: Negative.          Medical / Social / Family History     Past Medical History:   Diagnosis Date    Cellulitis 2022    COPD (chronic obstructive pulmonary disease)     COPD with acute exacerbation 2023    Shortness of breath 2022    Swelling of both ankles 2022    Swelling of both lower extremities 2022       History reviewed. No pertinent surgical history.    Social History    reports that he has been smoking cigarettes. He started smoking about 56 years ago. He has never used smokeless tobacco. He reports that he does not currently use alcohol. He reports that he does not use drugs.    Family History  's family history is not on file.    Medications and Allergies     Medications  Outpatient Medications Marked as Taking for the 24 encounter (Office Visit) with Moon Shields FNP   Medication Sig Dispense Refill    AREXVY, PF, 120 mcg/0.5 mL SusR vaccine       erythromycin (ROMYCIN) ophthalmic ointment SMARTSIG:Sparingly Left Eye Twice Daily      levalbuterol (XOPENEX HFA) 45 mcg/actuation inhaler SMARTSI Puff(s) By Mouth Every 6 Hours PRN       Current Facility-Administered Medications for the 24 encounter (Office Visit) with Moon Shields FNP   Medication Dose Route Frequency Provider Last Rate Last Admin    [COMPLETED] dexAMETHasone injection 4 mg  4 mg Intramuscular 1  time in Clinic/HOD Moon Shields FNP   4 mg at 02/09/24 1042    [COMPLETED] lincomycin injection 600 mg  600 mg Intramuscular 1 time in Clinic/HOD Moon Shields FNP   600 mg at 02/09/24 1043       Allergies  Review of patient's allergies indicates:   Allergen Reactions    Cephalexin Hives and Other (See Comments)     Swelling SOB      Amoxicillin Rash       Physical Examination     Vitals:    02/09/24 0944 02/09/24 1101   BP: 116/67    BP Location: Right arm    Patient Position: Sitting    BP Method: Medium (Automatic)    Pulse: (!) 122 95   Resp: (!) 21    Temp: 98.2 °F (36.8 °C)    TempSrc: Oral    SpO2: (!) 59% (!) 90%   Weight: 76.5 kg (168 lb 9.6 oz)       Physical Exam  Constitutional:       Appearance: Normal appearance.   HENT:      Head: Normocephalic.   Eyes:      Extraocular Movements: Extraocular movements intact.   Cardiovascular:      Rate and Rhythm: Normal rate and regular rhythm.      Pulses: Normal pulses.      Heart sounds: Normal heart sounds.   Pulmonary:      Effort: Pulmonary effort is normal. Tachypnea present. No bradypnea, accessory muscle usage, prolonged expiration, respiratory distress or retractions.      Breath sounds: Normal breath sounds. No stridor, decreased air movement or transmitted upper airway sounds.   Skin:     General: Skin is warm and dry.      Capillary Refill: Capillary refill takes less than 2 seconds.   Neurological:      General: No focal deficit present.      Mental Status: He is alert and oriented to person, place, and time.   Psychiatric:         Mood and Affect: Mood normal.         Behavior: Behavior normal.          Assessment and Plan (including Health Maintenance)   :    Plan:     There are no Patient Instructions on file for this visit.       Health Maintenance Due   Topic Date Due    Hepatitis C Screening  Never done    Shingles Vaccine (1 of 2) Never done    RSV Vaccine (Age 60+ and Pregnant patients) (1 - 1-dose 60+ series) Never done    Abdominal Aortic  Aneurysm Screening  Never done    DEXA Scan  09/21/2023       Problem List Items Addressed This Visit       Chronic cough    Current Assessment & Plan     See COPD plan.          Relevant Orders    POCT rapid strep A (Completed)    SARS Coronavirus 2 Antigen, POCT (Completed)    POCT Influenza A/B (Completed)    COPD exacerbation - Primary    Current Assessment & Plan     Flu, Strep, COVID negative.   Lincomycin 600mg/Decadron 4mg injections given. No adverse reaction noted   Zpak prescribed to take as directed. Instructed to take all abx until gone even if start to feel better.    Medrol Dose Marcia prescribed to take as directed.   Continue current medication regimen at home.     Instructed to apply O2 as needed at home even though he may be only ordered to wear this at night. He will monitor O2 and if is below 88% and sob he will apply O2.   Instructed if any difficulty breathing, worsening sob to go to ER. Pt vu.     Rapid Strep A Screen   Date Value Ref Range Status   02/09/2024 Negative Negative Final     Rapid Influenza A Ag   Date Value Ref Range Status   02/09/2024 Negative Negative Final     Rapid Influenza B Ag   Date Value Ref Range Status   02/09/2024 Negative Negative Final     SARS Coronavirus 2 Antigen   Date Value Ref Range Status   02/09/2024 Negative Negative Final            Relevant Medications    lincomycin injection 600 mg (Completed)    dexAMETHasone injection 4 mg (Completed)    azithromycin (Z-MARCIA) 250 MG tablet    methylPREDNISolone (MEDROL DOSEPACK) 4 mg tablet    Neck pain    Current Assessment & Plan     Neck pain  is controlled. Current medical regimen is effective;   Will adjust according to results and monitor.          Relevant Medications    gabapentin (NEURONTIN) 300 MG capsule    SOB (shortness of breath)    Current Assessment & Plan     See COPD plan.          Relevant Orders    POCT rapid strep A (Completed)    SARS Coronavirus 2 Antigen, POCT (Completed)    POCT Influenza A/B  (Completed)     COPD exacerbation  -     lincomycin injection 600 mg  -     dexAMETHasone injection 4 mg  -     azithromycin (Z-MARCIA) 250 MG tablet; Take 2 tablets by mouth on day 1; Take 1 tablet by mouth on days 2-5  Dispense: 6 tablet; Refill: 0  -     methylPREDNISolone (MEDROL DOSEPACK) 4 mg tablet; use as directed  Dispense: 21 each; Refill: 0    SOB (shortness of breath)  -     POCT rapid strep A  -     SARS Coronavirus 2 Antigen, POCT  -     POCT Influenza A/B    Chronic cough  -     POCT rapid strep A  -     SARS Coronavirus 2 Antigen, POCT  -     POCT Influenza A/B    Neck pain  -     gabapentin (NEURONTIN) 300 MG capsule; Take 1 capsule (300 mg total) by mouth every evening.  Dispense: 90 capsule; Refill: 1       Health Maintenance Topics with due status: Not Due       Topic Last Completion Date    Colorectal Cancer Screening 02/05/2016    TETANUS VACCINE 02/15/2020    Lipid Panel 05/20/2022         No future appointments.     Follow up if symptoms worsen or fail to improve.    Health Maintenance Due   Topic Date Due    Hepatitis C Screening  Never done    Shingles Vaccine (1 of 2) Never done    RSV Vaccine (Age 60+ and Pregnant patients) (1 - 1-dose 60+ series) Never done    Abdominal Aortic Aneurysm Screening  Never done    DEXA Scan  09/21/2023        Signature:  LELAND Elizabeth    Date of encounter: 2/9/24

## 2024-02-28 ENCOUNTER — HOSPITAL ENCOUNTER (EMERGENCY)
Facility: CLINIC | Age: 49
Discharge: HOME | End: 2024-02-28
Attending: STUDENT IN AN ORGANIZED HEALTH CARE EDUCATION/TRAINING PROGRAM | Admitting: STUDENT IN AN ORGANIZED HEALTH CARE EDUCATION/TRAINING PROGRAM
Payer: COMMERCIAL

## 2024-02-28 VITALS
SYSTOLIC BLOOD PRESSURE: 151 MMHG | WEIGHT: 208 LBS | OXYGEN SATURATION: 97 % | DIASTOLIC BLOOD PRESSURE: 87 MMHG | TEMPERATURE: 98 F | HEART RATE: 80 BPM | HEIGHT: 74 IN | BODY MASS INDEX: 26.69 KG/M2 | RESPIRATION RATE: 16 BRPM

## 2024-02-28 DIAGNOSIS — S61.412A LACERATION OF LEFT HAND WITHOUT FOREIGN BODY, INITIAL ENCOUNTER: ICD-10-CM

## 2024-02-28 DIAGNOSIS — S61.012A THUMB LACERATION, LEFT, INITIAL ENCOUNTER: ICD-10-CM

## 2024-02-28 PROCEDURE — 12002 RPR S/N/AX/GEN/TRNK2.6-7.5CM: CPT

## 2024-02-28 PROCEDURE — 99283 EMERGENCY DEPT VISIT LOW MDM: CPT

## 2024-02-28 ASSESSMENT — ACTIVITIES OF DAILY LIVING (ADL)
ADLS_ACUITY_SCORE: 35
ADLS_ACUITY_SCORE: 35

## 2024-02-28 NOTE — DISCHARGE INSTRUCTIONS
Leave bandage in place for 24 hours  After this you may gently clean the wound once daily with soap/water  Wear splint for 2-3 days, and for comfort after that, to help prevent excessive joint bending and to aid in healing  Follow up in 10-14 days for suture removal. This can be done here, at primary care, urgent care, or by any medical provider.   Return to the ED should you develop redness around the wound, streaking down the hand, fevers, chills, wound drainage, or any further concerns.  Hope you feel better soon!  Discharge Instructions  Laceration (Cut)    You were seen today for a laceration (cut).  Your provider examined your laceration for any problems such a buried foreign body (like glass, a splinter, or gravel), or injury to blood vessels, tendons, and nerves.  Your provider may have also rinsed and/or scrubbed your laceration to help prevent an infection. It may not be possible to find all problems with your laceration on the first visit; occasionally foreign bodies or a tendon injury can go undetected.    Your laceration may have been closed in one of several ways:  No closure: many wounds will heal just fine without closure.  Stitches: regular stitches that require removal.  Staples: skin staples are often used in the scalp/head.  Wound adhesive (glue): skin glue can be used for certain lacerations and doesn t require removal.  Wound strips (aka Butterfly bandages or steri-strips): these are bandages that help to close a wound.  Absorbable stitches:  dissolving  stitches that go away on their own and usually don t require removal.    A small percentage of wounds will develop an infection regardless of how well the wound is cared for. Antibiotics are generally not indicated to prevent an infection so are only given for a small number of high-risk wounds. Some lacerations are too high risk to close, and are left open to heal because closure can increase the likelihood that an infection will  develop.    Remember that all lacerations, no matter how expertly repaired, will cause scarring. We consider many factors, techniques, and materials, in our efforts to provide the best possible cosmetic outcome.    Generally, every Emergency Department visit should have a follow-up clinic visit with either a primary or a specialty clinic/provider. Please follow-up as instructed by your emergency provider today.     Return to the Emergency Department right away if:  You have more redness, swelling, pain, drainage (pus), a bad smell, or red streaking from your laceration as these symptoms could indicate an infection.  You have a fever of 100.4 F or more.  You have bleeding that you cannot stop at home. If your cut starts to bleed, hold pressure on the bleeding area with a clean cloth or put pressure over the bandage.  If the bleeding does not stop after using constant pressure for 30 minutes, you should return to the Emergency Department for further treatment.  An area past the laceration is cool, pale, or blue compared with the other side, or has a slower return of color when squeezed.  Your dressing seems too tight or starts to get uncomfortable or painful. For children, signs of a problem might be irritability or restlessness.  You have loss of normal function or use of an area, such as being unable to straighten or bend a finger normally.  You have a numb area past the laceration.    Return to the Emergency Department or see your regular provider if:  The laceration starts to come open.   You have something coming out of the cut or a feeling that there is something in the laceration.  Your wound will not heal, or keeps breaking open. There can always be glass, wood, dirt or other things in any wound.  They will not always show up, even on x-rays.  If a wound does not heal, this may be why, and it is important to follow-up with your regular provider.    Home Care:  Take your dressing off in 12-24 hours, or as  instructed by your provider, to check your laceration. Remove the dressing sooner if it seems too tight or painful, or if it is getting numb, tingly, or pale past the dressing.  Gently wash your laceration 1-2 times daily with clean water and mild soap. It is okay to shower or run clean water over the laceration, but do not let the laceration soak in water (no swimming).  If your laceration was closed with wound adhesive or strips: pat it dry and leave it open to the air. For all other repairs: after you wash your laceration, or at least 2 times a day, apply antibiotic ointment (such as Neosporin  or Bacitracin ) to the laceration, then cover it with a Band-Aid  or gauze.  Keep the laceration clean. Wear gloves or other protective clothing if you are around dirt.    Follow-up for removal:  If your wound was closed with staples or regular stitches, they need to be removed according to the instructions and timeline specified by your provider today.  If your wound was closed with absorbable ( dissolving ) sutures, they should fall out, dissolve, or not be visible in about one week. If they are still visible, then they should be removed according to the instructions and timeline specified by your provider today.    Scars:  To help minimize scarring:  Wear sunscreen over the healed laceration when out in the sun.  Massage the area regularly once healed.  You may apply Vitamin E to the healed wound.  Wait. Scars improve in appearance over months and years.    If you were given a prescription for medicine here today, be sure to read all of the information (including the package insert) that comes with your prescription.  This will include important information about the medicine, its side effects, and any warnings that you need to know about.  The pharmacist who fills the prescription can provide more information and answer questions you may have about the medicine.  If you have questions or concerns that the pharmacist  cannot address, please call or return to the Emergency Department.       Remember that you can always come back to the Emergency Department if you are not able to see your regular provider in the amount of time listed above, if you get any new symptoms, or if there is anything that worries you.

## 2024-02-28 NOTE — ED PROVIDER NOTES
"  History     Chief Complaint:  Laceration     HPI   Yogesh Alanis is a 48 year old male who presents for evaluation of a laceration. The patient reports that around 20 minutes prior to arrival, he lacerated the posterior aspect of his left distal thumb joint with a brand new razor bladed scraper. Denies use of blood thinners. Denies history of diabetes and tobacco use.  He is right handed.    Independent Historian:   None - Patient Only    Review of External Notes:   I reviewed [facility] which shows Tdap booster was given in 2018.    Medications:    The patient is currently on no regular medications.    Past Medical History:    Anxiety  GERD    Past Surgical History:    Achilles tendon repair, left  Knee surgery  Appendectomy      Physical Exam   Patient Vitals for the past 24 hrs:   BP Temp Temp src Pulse Resp SpO2 Height Weight   02/28/24 1602 (!) 151/87 98  F (36.7  C) Oral 80 16 97 % -- --   02/28/24 1601 -- -- -- -- -- -- 1.88 m (6' 2\") 94.3 kg (208 lb)      Physical Exam  Vital signs and nursing notes reviewed    General: Alert, appropriate  Cardiovascular: Appears well perfused. Intact radial pulse bilaterally  Pulmonary: No respiratory distress  Musculoskeletal: Tendon function normal with full ROM of flexion and extension against strength at the interphalangeal joint of the thumb  Neuro: Normal strength and two point sensation of the thumb  Skin: 2 cm diagonal laceration to the dorsal aspect of the left thumb, overlying the IP joint, actively bleeding. No appreciated tendon laceration when assessed in a bloodless field through full range of motion  3.5 cm filleted superficial laceration/skin tear to dorsum of hand proximal to second digit. No actively bleeding    Emergency Department Course   Procedures     Laceration Repair      Procedure: Laceration Repair    Indication: Laceration    Consent: Verbal    Location: Left L first (thumb) finger    Length: 2.0 cm    Preparation: Irrigation with sterile saline. "     Anesthesia/Sedation: Bupivacaine - 0.5%      Treatment/Exploration: Wound explored, no foreign bodies found     Closure: The wound was closed with one layer. Skin/superficial layer was closed with 4 x 5-0 Nylon using Interrupted sutures.     Patient Status: The patient tolerated the procedure well: Yes. There were no complications.        Laceration Repair      Procedure: Laceration Repair    Indication: Laceration    Consent: Verbal    Location: Left Hand     Length: 3.5 cm    Preparation: Irrigation with Sterile Saline.    Anesthesia/Sedation: none       Treatment/Exploration: Wound explored, no foreign bodies found     Closure: The wound was closed with Tissue Adhesive.    Patient Status: The patient tolerated the procedure well: Yes. There were no complications.    Emergency Department Course & Assessments:    Interventions:  Medications - No data to display     Independent Interpretation (X-rays, CTs, rhythm strip):  None     Assessments/Consultations/Discussion of Management or Tests:  ED Course as of 02/28/24 1659   Wed Feb 28, 2024   1614 I initially assessed the patient and obtained the above history and physical exam.     1631 I reassessed the patient and updated them on results and plan of care. I performed laceration repair as noted above.       Social Determinants of Health affecting care:   None    Disposition:  The patient was discharged.     Impression & Plan    Medical Decision Making:  Yogesh Alanis is a 48 year old male who presents to the Emergency Department with an accidental injury to the left hand from a razor blade.  See HPI. Vital signs stable. On exam, he has laceration to dorsum of left hand, and a superficial filleted/skin tear laceration to the dorsum of his hand, which were both repaired as noted above. There is no evidence at this time of associated fracture or foreign body, deep space infection, tendon injury, or neurovascular injury. The patient is to follow-up for suture  removal in 10-14 days. Using reasonable clinical judgement, patient can safely discharge home at this time. Wound was dressed and Alumafoam splint placed to wear for a few days since the laceration is over the joint. Indications for immediate return to ER were reviewed and included but are not limited to, redness, fevers, drainage, increasing pain, or other concerns. Patient agreeable to plan and had questions answered. Tetanus up to date.    Diagnosis:    ICD-10-CM    1. Thumb laceration, left, initial encounter  S61.012A       2. Laceration of left hand without foreign body, initial encounter  S61.412A          Scribe Disclosure:  I, Nathalie Rutherford, am serving as a scribe at 4:04 PM on 2/28/2024 to document services personally performed by Manasa Miller PA-C based on my observations and the provider's statements to me.     2/28/2024   Manasa Miller PA-C Victoria J. HARJEET Miller on 2/28/2024 at 5:16 PM         Manasa Miller PA-C  02/28/24 1717

## 2025-01-04 ENCOUNTER — HEALTH MAINTENANCE LETTER (OUTPATIENT)
Age: 50
End: 2025-01-04

## (undated) RX ORDER — FENTANYL CITRATE 50 UG/ML
INJECTION, SOLUTION INTRAMUSCULAR; INTRAVENOUS
Status: DISPENSED
Start: 2022-04-27